# Patient Record
Sex: MALE | Race: WHITE | Employment: FULL TIME | URBAN - METROPOLITAN AREA
[De-identification: names, ages, dates, MRNs, and addresses within clinical notes are randomized per-mention and may not be internally consistent; named-entity substitution may affect disease eponyms.]

---

## 2017-05-03 ENCOUNTER — APPOINTMENT (OUTPATIENT)
Dept: OCCUPATIONAL MEDICINE | Facility: CLINIC | Age: 22
End: 2017-05-03

## 2017-05-03 PROCEDURE — 86580 TB INTRADERMAL TEST: CPT | Performed by: PHYSICIAN ASSISTANT

## 2018-05-01 ENCOUNTER — OFFICE VISIT (OUTPATIENT)
Dept: FAMILY MEDICINE | Facility: CLINIC | Age: 23
End: 2018-05-01

## 2018-05-01 VITALS
WEIGHT: 266 LBS | DIASTOLIC BLOOD PRESSURE: 81 MMHG | RESPIRATION RATE: 16 BRPM | TEMPERATURE: 99 F | BODY MASS INDEX: 34.14 KG/M2 | HEART RATE: 69 BPM | SYSTOLIC BLOOD PRESSURE: 139 MMHG | HEIGHT: 74 IN

## 2018-05-01 DIAGNOSIS — N50.89 TESTICULAR MASS: Primary | ICD-10-CM

## 2018-05-01 PROCEDURE — 99213 OFFICE O/P EST LOW 20 MIN: CPT | Performed by: FAMILY MEDICINE

## 2018-05-01 ASSESSMENT — PAIN SCALES - GENERAL: PAINLEVEL: NO PAIN (0)

## 2018-05-01 NOTE — NURSING NOTE
"Chief Complaint   Patient presents with     Groin Swelling     testicle pain 1 week       Initial /81  Pulse 69  Temp 99  F (37.2  C) (Tympanic)  Resp 16  Ht 6' 2\" (1.88 m)  Wt 266 lb (120.7 kg)  BMI 34.15 kg/m2 Estimated body mass index is 34.15 kg/(m^2) as calculated from the following:    Height as of this encounter: 6' 2\" (1.88 m).    Weight as of this encounter: 266 lb (120.7 kg).      Health Maintenance that is potentially due pending provider review:  ACT    Possibly completing today per provider review.    Is there anyone who you would like to be able to receive your results? No  If yes have patient fill out KENNY      "

## 2018-05-01 NOTE — MR AVS SNAPSHOT
"              After Visit Summary   5/1/2018    Iván Burton    MRN: [id number]           Patient Information     Date Of Birth          1995        Visit Information        Provider Department      5/1/2018 4:20 PM Al Pinzon MD [facility]        Today's Diagnoses     Testicular mass    -  1      Care Instructions    1. I am not to concerned about this but lets do US    2. I will call on result              Follow-ups after your visit        Future tests that were ordered for you today     Open Future Orders        Priority Expected Expires Ordered    US Testicular & Scrotum w Doppler Ltd Routine  5/1/2019 5/1/2018            Who to contact     If you have questions or need follow up information about today's clinic visit or your schedule please contact [facility] directly at [phone].  Normal or non-critical lab and imaging results will be communicated to you by MyChart, letter or phone within 4 business days after the clinic has received the results. If you do not hear from us within 7 days, please contact the clinic through MyChart or phone. If you have a critical or abnormal lab result, we will notify you by phone as soon as possible.  Submit refill requests through [employer] or call your pharmacy and they will forward the refill request to us. Please allow 3 business days for your refill to be completed.          Additional Information About Your Visit        MyChart Information     [employer] lets you send messages to your doctor, view your test results, renew your prescriptions, schedule appointments and more. To sign up, go to [email or web address] . Click on \"Log in\" on the left side of the screen, which will take you to the Welcome page. Then click on \"Sign up Now\" on the right side of the page.     You will be asked to enter the access code listed below, as well as some personal information. Please follow the directions to create your " "username and password.     Your access code is: [email or web address]  Expires: 2018  4:29 PM     Your access code will  in 90 days. If you need help or a new code, please call your [facility] or [phone].        Care EveryWhere ID     This is your Care EveryWhere ID. This could be used by other organizations to access your [facility] medical records  [id number]        Your Vitals Were     Pulse Temperature Respirations Height BMI (Body Mass Index)       69 99  F (37.2  C) (Tympanic) 16 6' 2\" (1.88 m) 34.15 kg/m2        Blood Pressure from Last 3 Encounters:   18 139/81   16 130/56   01/12/15 112/70    Weight from Last 3 Encounters:   18 266 lb (120.7 kg)   16 216 lb 12.8 oz (98.3 kg) (97 %)*   01/12/15 218 lb (98.9 kg) (97 %)*     * Growth percentiles are based on CDC 2-20 Years data.               Primary Care Provider Fax #    Physician No Ref-Primary [phone]       No address on file        Equal Access to Services     SALMA MOJICA AH: Hadmaryjane Toussaint, wavalentineda lukhrisadaha, qaybta kaalmada adeoliverda, mckenna reno. So [facility] [phone].    ATENCIÓN: Si habla español, tiene a jauregui disposición servicios gratuitos de asistencia lingüística. Llame al [phone].    We comply with applicable federal civil rights laws and [place] laws. We do not discriminate on the basis of race, color, national origin, age, disability, sex, sexual orientation, or gender identity.            Thank you!     Thank you for choosing [facility]  for your care. Our goal is always to provide you with excellent care. Hearing back from our patients is one way we can continue to improve our services. Please take a few minutes to complete the written survey that you may receive in the mail after your visit with us. Thank you!             Your Updated Medication List - Protect others around you: Learn how to safely use, store and throw away your " medicines at www.disposemymeds.org.          This list is accurate as of 5/1/18  4:29 PM.  Always use your most recent med list.                   Brand Name Dispense Instructions for use Diagnosis    TYLENOL PO      AS DIRECTED FOR PAIN

## 2018-05-01 NOTE — PROGRESS NOTES
SUBJECTIVE:   Iván Burton is a 22 year old male who presents to clinic today for the following health issues:      Testicle pain/lump  HE COMES WITH SOME PAIN AND A SWELLING ON HIS RIGHT TESTICLE.  THINKS HE MAY HAVE HAD SOME NODULE THERE BEFORE BUT NOW WITH PAIN       Duration: 1 week    Description (location/character/radiation): right testicle has lump    Intensity:  moderate    Accompanying signs and symptoms: sore    History (similar episodes/previous evaluation): sexually active, no concern for STD, no family hx of testicular cancers    Precipitating or alleviating factors: None    Therapies tried and outcome: None           Problem list and histories reviewed & adjusted, as indicated.  Additional history: as documented    Patient Active Problem List   Diagnosis     Attention deficit hyperactivity disorder (ADHD)     Generalized anxiety disorder     Chronic constipation     Mild major depression (H)     GERD (gastroesophageal reflux disease)     Moderate persistent asthma     Past Surgical History:   Procedure Laterality Date     SURGICAL HISTORY OF -   less than 2-1/2 years old    thumb injury        Social History   Substance Use Topics     Smoking status: Never Smoker     Smokeless tobacco: Never Used     Alcohol use No     Family History   Problem Relation Age of Onset     Unknown/Adopted Father      Unknown/Adopted Paternal Grandmother      Unknown/Adopted Paternal Grandfather          Current Outpatient Prescriptions   Medication Sig Dispense Refill     TYLENOL OR AS DIRECTED FOR PAIN       Allergies   Allergen Reactions     No Known Drug Allergies        Reviewed and updated as needed this visit by clinical staff  Tobacco  Allergies  Meds       Reviewed and updated as needed this visit by Provider         ROS:  CONSTITUTIONAL: NEGATIVE for fever, chills, change in weight  ENT/MOUTH: NEGATIVE for ear, mouth and throat problems  RESP: NEGATIVE for significant cough or SOB  CV: NEGATIVE for chest  "pain, palpitations or peripheral edema    OBJECTIVE:     /81  Pulse 69  Temp 99  F (37.2  C) (Tympanic)  Resp 16  Ht 6' 2\" (1.88 m)  Wt 266 lb (120.7 kg)  BMI 34.15 kg/m2  Body mass index is 34.15 kg/(m^2).  GENERAL: healthy, alert and no distress  THE RIGHT TESTICLE HAS A PROMINENT AREA AT THE LOWER POLE.  NO TENDER TO EXAM.          ASSESSMENT/PLAN:             1. Testicular mass  WILL GET:   - US Testicular & Scrotum w Doppler Ltd; Future      Al Pinzon MD  [facility]    "

## 2018-05-02 ENCOUNTER — HOSPITAL ENCOUNTER (EMERGENCY)
Facility: CLINIC | Age: 23
Discharge: LEFT BEFORE DISCHARGE | End: 2018-05-03
Attending: EMERGENCY MEDICINE | Admitting: EMERGENCY MEDICINE

## 2018-05-02 VITALS
SYSTOLIC BLOOD PRESSURE: 127 MMHG | TEMPERATURE: 98.1 F | DIASTOLIC BLOOD PRESSURE: 75 MMHG | WEIGHT: 265 LBS | HEIGHT: 74 IN | RESPIRATION RATE: 16 BRPM | BODY MASS INDEX: 34.01 KG/M2 | OXYGEN SATURATION: 99 %

## 2018-05-02 PROCEDURE — 40000268 ZZH STATISTIC NO CHARGES: Performed by: EMERGENCY MEDICINE

## 2018-05-03 NOTE — ED TRIAGE NOTES
R testicle pain and swelling for approx 1 week was seen in clinic yesterday has ultrasound scheduled on Friday   Today around 1630 jumped down from truck bed had increase in pain and swelling tried ice no relief  No difficulty urinating

## 2018-05-03 NOTE — ED NOTES
Checked lobby again pt has not been seen by security or registration  Assume left without being seen

## 2019-04-05 ENCOUNTER — TELEPHONE (OUTPATIENT)
Dept: FAMILY MEDICINE | Facility: CLINIC | Age: 24
End: 2019-04-05

## 2019-04-05 NOTE — TELEPHONE ENCOUNTER
Panel Management Review      Patient has the following on his problem list:     Asthma review     ACT Total Scores 3/14/2016   ACT TOTAL SCORE -   ASTHMA ER VISITS -   ASTHMA HOSPITALIZATIONS -   ACT TOTAL SCORE (Goal Greater than or Equal to 20) 25   In the past 12 months, how many times did you visit the emergency room for your asthma without being admitted to the hospital? 0   In the past 12 months, how many times were you hospitalized overnight because of your asthma? 0      1. Is Asthma diagnosis on the Problem List? Yes    2. Is Asthma listed on Health Maintenance? Yes    3. Patient is due for:  ACT and AAP      Composite cancer screening  Chart review shows that this patient is due/due soon for the following None  Summary:    Patient is due/failing the following:   AAP and ACT    Action needed:   Patient needs to do ACT.    Type of outreach:    Sent letter.    Questions for provider review:    None                                                                                                                                    Dayna Mancilla MA

## 2020-03-02 ENCOUNTER — OFFICE VISIT (OUTPATIENT)
Dept: FAMILY MEDICINE | Facility: CLINIC | Age: 25
End: 2020-03-02

## 2020-03-02 VITALS
HEART RATE: 76 BPM | WEIGHT: 285 LBS | TEMPERATURE: 97.8 F | RESPIRATION RATE: 16 BRPM | BODY MASS INDEX: 36.57 KG/M2 | DIASTOLIC BLOOD PRESSURE: 80 MMHG | OXYGEN SATURATION: 97 % | SYSTOLIC BLOOD PRESSURE: 128 MMHG | HEIGHT: 74 IN

## 2020-03-02 DIAGNOSIS — R10.2 PERINEAL PAIN IN MALE: ICD-10-CM

## 2020-03-02 DIAGNOSIS — E66.812 CLASS 2 OBESITY WITHOUT SERIOUS COMORBIDITY WITH BODY MASS INDEX (BMI) OF 36.0 TO 36.9 IN ADULT, UNSPECIFIED OBESITY TYPE: ICD-10-CM

## 2020-03-02 DIAGNOSIS — K21.9 GASTROESOPHAGEAL REFLUX DISEASE WITHOUT ESOPHAGITIS: Primary | ICD-10-CM

## 2020-03-02 PROCEDURE — 99213 OFFICE O/P EST LOW 20 MIN: CPT | Performed by: FAMILY MEDICINE

## 2020-03-02 ASSESSMENT — ASTHMA QUESTIONNAIRES
QUESTION_4 LAST FOUR WEEKS HOW OFTEN HAVE YOU USED YOUR RESCUE INHALER OR NEBULIZER MEDICATION (SUCH AS ALBUTEROL): NOT AT ALL
QUESTION_5 LAST FOUR WEEKS HOW WOULD YOU RATE YOUR ASTHMA CONTROL: COMPLETELY CONTROLLED
QUESTION_1 LAST FOUR WEEKS HOW MUCH OF THE TIME DID YOUR ASTHMA KEEP YOU FROM GETTING AS MUCH DONE AT WORK, SCHOOL OR AT HOME: NONE OF THE TIME
QUESTION_3 LAST FOUR WEEKS HOW OFTEN DID YOUR ASTHMA SYMPTOMS (WHEEZING, COUGHING, SHORTNESS OF BREATH, CHEST TIGHTNESS OR PAIN) WAKE YOU UP AT NIGHT OR EARLIER THAN USUAL IN THE MORNING: NOT AT ALL
QUESTION_2 LAST FOUR WEEKS HOW OFTEN HAVE YOU HAD SHORTNESS OF BREATH: NOT AT ALL
ACT_TOTALSCORE: 25

## 2020-03-02 ASSESSMENT — MIFFLIN-ST. JEOR: SCORE: 2357.5

## 2020-03-02 NOTE — PATIENT INSTRUCTIONS
ASSESSMENT:   (K21.9) Gastroesophageal reflux disease without esophagitis  (primary encounter diagnosis)  Comment: bloating and acid reflux.   Plan: omeprazole (PRILOSEC) 20 MG DR capsule          Gastroesophageal reflux (GERD) lifestyle changes that can help improve symptoms include:  Eating smaller meals and not lying down after meals  Elevate head of bed with 6-8 inch blocks or a wedge pillow.   Avoid lying flat on back after eating and at bedtime  Avoid tight fitting clothing  Avoid reflux inducing foods like fat, caffeine, chocolate, carbonated beverages  Maintain an ideal body weight  Avoid alcohol and tobacco  Avoid medications like ibuprofen, naproxen and aspirin    Take omeprazole for 4-6 weeks.  You should be better in 2 weeks.   If doing well, take for 6 weeks and stop.    If not better, let me know.     (R10.2) Perineal pain in male  Comment: No sign of infection.  There may be inflammation in your rectal area.   Plan:   Try Aleve.  Take two pills twice daily for 10 days.  Take with food.   If not improving in the next week or worse.

## 2020-03-02 NOTE — PROGRESS NOTES
SUBJECTIVE: Iván Burton is a 24 year old male  Chief Complaint   Patient presents with     Abdominal Pain      Gastrointestinal symptoms      Duration: 2 weeks for abdominal   Rectal  2 weeks    Description:           ABDOMINAL PAIN - epigastric area  .   Pain is described as aching and bloating     Intensity:  1/10    Accompanying signs and symptoms:  nausea, diarrhea at times and bloating  Has increase in numbers of stools he has    History  Previous {similar problem: Bloating in the past  Previous evaluation:  none    Aggravating factors: after eating will notice bloating    Alleviating factors:     Other Therapies tried: none      Two weeks ago rectum started hurting.  He had some diarrhea, a couple times.    Some shooting pain toward scrotum.    Felt pressure in perineum.    Course of symptoms over time: rectal pain a little better.     Continuing pain in rectum.  No blood.     Some bloating during the same time period.   Triggering factors: eating.  No specific foods.   occasional nausea.  No emesis.   He has had acid reflux.  Can wake up choking on it.  Onset of symptoms: past year a couple times a month.  Not taking medication.  Tums helped.    No tobacco  Alcohol:sometimes.   Caffeine: energy drink in AM.   Coffee daily.  30  Oz a day.  Little pop.     Bowels usually regular 1-2 stools a day.  In the past 3 weeks softer and three times daily.   Has not been having constipation.   He has been taking Benefiber-has had constipation in the past.    : No dysuria, bladder or kidney problems, POSITIVE for:, urinary frequency but drinks a lot of water.      Sexually active.  One partner for 5 years opposite sex.  No exposure for the past 2 years.  Had chlamydia 2 years ago-treated.     Patient Active Problem List   Diagnosis     Attention deficit hyperactivity disorder (ADHD)     Generalized anxiety disorder     Chronic constipation     Mild major depression (H)     GERD (gastroesophageal reflux disease)      "Moderate persistent asthma      OBJECTIVE:Blood pressure 128/80, pulse 76, temperature 97.8  F (36.6  C), temperature source Tympanic, resp. rate 16, height 1.88 m (6' 2\"), weight 129.3 kg (285 lb), SpO2 97 %. BMI=Body mass index is 36.59 kg/m .  GENERAL APPEARANCE ADULT: Alert, no acute distress, obese  NECK: No adenopathy,masses or thyromegaly  CV: normal rate, regular rhythm, no murmur or gallop  ABDOMEN: soft, no organomegaly, masses or tenderness, femoral pulses: normal.  NO inguinal adenopathy.    (male): penis, scrotum, testes normal, no hernias, non-tender testes and epididymus.  Anus normal, rectal exam normal     ASSESSMENT:   (K21.9) Gastroesophageal reflux disease without esophagitis  (primary encounter diagnosis)  Comment: bloating and acid reflux.   Plan: omeprazole (PRILOSEC) 20 MG DR capsule          Gastroesophageal reflux (GERD) lifestyle changes that can help improve symptoms include:  Eating smaller meals and not lying down after meals  Elevate head of bed with 6-8 inch blocks or a wedge pillow.   Avoid lying flat on back after eating and at bedtime  Avoid tight fitting clothing  Avoid reflux inducing foods like fat, caffeine, chocolate, carbonated beverages  Maintain an ideal body weight  Avoid alcohol and tobacco  Avoid medications like ibuprofen, naproxen and aspirin    Take omeprazole for 4-6 weeks.  You should be better in 2 weeks.   If doing well, take for 6 weeks and stop.    If not better, let me know.     (R10.2) Perineal pain in male  Comment: No sign of infection.  There may be inflammation in your rectal area.   Plan:   Try Aleve.  Take two pills twice daily for 10 days.  Take with food.   If not improving in the next week or worse.           "

## 2020-03-03 ASSESSMENT — ASTHMA QUESTIONNAIRES: ACT_TOTALSCORE: 25

## 2020-03-09 PROBLEM — E66.9 OBESITY: Status: ACTIVE | Noted: 2020-03-09

## 2021-01-28 ENCOUNTER — OFFICE VISIT (OUTPATIENT)
Dept: FAMILY MEDICINE | Facility: CLINIC | Age: 26
End: 2021-01-28
Payer: COMMERCIAL

## 2021-01-28 VITALS
OXYGEN SATURATION: 97 % | RESPIRATION RATE: 14 BRPM | SYSTOLIC BLOOD PRESSURE: 136 MMHG | HEART RATE: 72 BPM | TEMPERATURE: 98.2 F | DIASTOLIC BLOOD PRESSURE: 76 MMHG

## 2021-01-28 DIAGNOSIS — H69.91 DYSFUNCTION OF RIGHT EUSTACHIAN TUBE: Primary | ICD-10-CM

## 2021-01-28 DIAGNOSIS — F32.0 MILD MAJOR DEPRESSION (H): ICD-10-CM

## 2021-01-28 PROCEDURE — 99214 OFFICE O/P EST MOD 30 MIN: CPT | Performed by: PHYSICIAN ASSISTANT

## 2021-01-28 RX ORDER — ESCITALOPRAM OXALATE 10 MG/1
10 TABLET ORAL DAILY
Qty: 60 TABLET | Refills: 1 | Status: SHIPPED | OUTPATIENT
Start: 2021-01-28 | End: 2021-11-02

## 2021-01-28 ASSESSMENT — ANXIETY QUESTIONNAIRES
6. BECOMING EASILY ANNOYED OR IRRITABLE: MORE THAN HALF THE DAYS
GAD7 TOTAL SCORE: 11
5. BEING SO RESTLESS THAT IT IS HARD TO SIT STILL: SEVERAL DAYS
7. FEELING AFRAID AS IF SOMETHING AWFUL MIGHT HAPPEN: MORE THAN HALF THE DAYS
1. FEELING NERVOUS, ANXIOUS, OR ON EDGE: MORE THAN HALF THE DAYS
2. NOT BEING ABLE TO STOP OR CONTROL WORRYING: MORE THAN HALF THE DAYS
3. WORRYING TOO MUCH ABOUT DIFFERENT THINGS: SEVERAL DAYS

## 2021-01-28 ASSESSMENT — PATIENT HEALTH QUESTIONNAIRE - PHQ9
SUM OF ALL RESPONSES TO PHQ QUESTIONS 1-9: 8
5. POOR APPETITE OR OVEREATING: SEVERAL DAYS

## 2021-01-28 NOTE — PROGRESS NOTES
"  Assessment & Plan   Dysfunction of right eustachian tube  R sided throat pain and ear pain x 3 weeks. No progression. Had Covid 2 months ago that resolved. Vitals and exam are wnl. Possibly some bulging of the Tms bilaterally. Suspect allergies vs eustachian tube dysfunction. Recommend daily allergy pill and Ibuprofen. RTC prn for any new, changing or worsening symptoms.      Mild major depression (H)  Worsening depression. Grandfather recently passed away whom he was close to. PHQ and BLAKE 7 scores are elevated. Used to take Prozac years ago, not sure if it was effective or not. Discussed medication side effects. Patient made the informed decision to start Lexapro today. Follow-up in 4-6 weeks for reevaluation.     - escitalopram (LEXAPRO) 10 MG tablet; Take 1 tablet (10 mg) by mouth daily     BMI:   Estimated body mass index is 36.59 kg/m  as calculated from the following:    Height as of 3/2/20: 1.88 m (6' 2\").    Weight as of 3/2/20: 129.3 kg (285 lb).     Return in about 4 weeks (around 2021) for Video Visit, In-Clinic Visit.    STEPHANIE Godinez [facility] [place]    Subjective     Iván is a 25 year old who presents to clinic today for the following health issues     HPI   Acute Illness  Acute illness concerns: Throat pain, ear pain. Was positive for COVID on 2020  Onset/Duration: 3 weeks   Symptoms:  Fever: no  Chills/Sweats: no  Headache (location?): no  Sinus Pressure: YES  Conjunctivitis:  no  Ear Pain: YES: right  Rhinorrhea: no  Congestion: YES  Sore Throat: YES  Cough: YES-productive of yellow sputum  Wheeze: no  Decreased Appetite: no  Nausea: no  Vomiting: no  Diarrhea: no  Dysuria/Freq.: no  Dysuria or Hematuria: no  Fatigue/Achiness: no  Sick/Strep Exposure: no  Therapies tried and outcome: Tylenol, Ibuprofen and mucinex     Also with depression. Grandfather just . Anxiety has been worse as well.   PHQ 3/14/2016 2021   PHQ-9 Total Score 2 8   Q9: " Thoughts of better off dead/self-harm past 2 weeks Not at all Not at all     BLAKE-7 SCORE 1/12/2015 3/14/2016 1/28/2021   Total Score 0 - -   Total Score - 0 11       Review of Systems   Constitutional, HEENT, cardiovascular, pulmonary, gi and gu systems are negative, except as otherwise noted.      Objective    /76 (BP Location: Right arm, Patient Position: Sitting, Cuff Size: Adult Large)   Pulse 72   Temp 98.2  F (36.8  C) (Tympanic)   Resp 14   SpO2 97%   There is no height or weight on file to calculate BMI.  Physical Exam   Constitutional: healthy, alert, and no distress  Head: Normocephalic. Atraumatic  Eyes: No conjunctival injection, sclera anicteric  ENT: MMM, PO clear without tonsillar enlargement. No exudates. TMs and canals wnl, possibly some bulging bilaterally.   Neck: supple, no thyromegaly, nodules or asymmetry of the thyroid. No cervical LAD.  Cardiovascular: RRR. No murmurs, clicks, gallops, or rubs. No peripheral edema.   Respiratory: No resp distress. Lungs CTAB bilaterally.   Musculoskeletal: extremities normal- no gross deformities noted, and normal muscle tone  Skin: no suspicious lesions or rashes  Neurologic: Gait normal. CN 2-12 grossly intact  Psychiatric: mentation appears normal and affect normal/bright

## 2021-01-29 ASSESSMENT — ANXIETY QUESTIONNAIRES: GAD7 TOTAL SCORE: 11

## 2021-02-25 ENCOUNTER — VIRTUAL VISIT (OUTPATIENT)
Dept: FAMILY MEDICINE | Facility: CLINIC | Age: 26
End: 2021-02-25
Payer: COMMERCIAL

## 2021-02-25 DIAGNOSIS — Z53.9 ERRONEOUS ENCOUNTER--DISREGARD: Primary | ICD-10-CM

## 2021-02-25 ASSESSMENT — ANXIETY QUESTIONNAIRES
7. FEELING AFRAID AS IF SOMETHING AWFUL MIGHT HAPPEN: SEVERAL DAYS
1. FEELING NERVOUS, ANXIOUS, OR ON EDGE: SEVERAL DAYS
2. NOT BEING ABLE TO STOP OR CONTROL WORRYING: SEVERAL DAYS
GAD7 TOTAL SCORE: 8
3. WORRYING TOO MUCH ABOUT DIFFERENT THINGS: SEVERAL DAYS
5. BEING SO RESTLESS THAT IT IS HARD TO SIT STILL: SEVERAL DAYS
6. BECOMING EASILY ANNOYED OR IRRITABLE: SEVERAL DAYS

## 2021-02-25 ASSESSMENT — PATIENT HEALTH QUESTIONNAIRE - PHQ9
SUM OF ALL RESPONSES TO PHQ QUESTIONS 1-9: 3
5. POOR APPETITE OR OVEREATING: MORE THAN HALF THE DAYS

## 2021-02-25 NOTE — PROGRESS NOTES
Pt never answered video visit. Reached out three times throughout the day. Left two voicemails for patient to return call. Pt will need to reschedule appt.     Iván Delgado PA-C

## 2021-02-26 ASSESSMENT — ANXIETY QUESTIONNAIRES: GAD7 TOTAL SCORE: 8

## 2021-05-03 ENCOUNTER — ALLIED HEALTH/NURSE VISIT (OUTPATIENT)
Dept: FAMILY MEDICINE | Facility: CLINIC | Age: 26
End: 2021-05-03
Payer: COMMERCIAL

## 2021-05-03 ENCOUNTER — TELEPHONE (OUTPATIENT)
Dept: FAMILY MEDICINE | Facility: CLINIC | Age: 26
End: 2021-05-03

## 2021-05-03 DIAGNOSIS — J45.40 MODERATE PERSISTENT ASTHMA WITHOUT COMPLICATION: Primary | ICD-10-CM

## 2021-05-03 PROCEDURE — 99207 PR NO CHARGE NURSE ONLY: CPT | Performed by: PHYSICIAN ASSISTANT

## 2021-05-03 ASSESSMENT — ASTHMA QUESTIONNAIRES
QUESTION_3 LAST FOUR WEEKS HOW OFTEN DID YOUR ASTHMA SYMPTOMS (WHEEZING, COUGHING, SHORTNESS OF BREATH, CHEST TIGHTNESS OR PAIN) WAKE YOU UP AT NIGHT OR EARLIER THAN USUAL IN THE MORNING: NOT AT ALL
QUESTION_5 LAST FOUR WEEKS HOW WOULD YOU RATE YOUR ASTHMA CONTROL: COMPLETELY CONTROLLED
QUESTION_2 LAST FOUR WEEKS HOW OFTEN HAVE YOU HAD SHORTNESS OF BREATH: NOT AT ALL
QUESTION_4 LAST FOUR WEEKS HOW OFTEN HAVE YOU USED YOUR RESCUE INHALER OR NEBULIZER MEDICATION (SUCH AS ALBUTEROL): NOT AT ALL
ACT_TOTALSCORE: 25
QUESTION_1 LAST FOUR WEEKS HOW MUCH OF THE TIME DID YOUR ASTHMA KEEP YOU FROM GETTING AS MUCH DONE AT WORK, SCHOOL OR AT HOME: NONE OF THE TIME

## 2021-05-03 NOTE — TELEPHONE ENCOUNTER
Left message for patient to call the clinic.   Please complete asthma control test. Questions have previously been mailed to patient.   Janet Busby CMA

## 2021-05-04 ASSESSMENT — ASTHMA QUESTIONNAIRES: ACT_TOTALSCORE: 25

## 2021-11-02 ENCOUNTER — OFFICE VISIT (OUTPATIENT)
Dept: FAMILY MEDICINE | Facility: CLINIC | Age: 26
End: 2021-11-02
Payer: COMMERCIAL

## 2021-11-02 VITALS
TEMPERATURE: 97 F | RESPIRATION RATE: 18 BRPM | HEART RATE: 80 BPM | SYSTOLIC BLOOD PRESSURE: 142 MMHG | BODY MASS INDEX: 38.76 KG/M2 | WEIGHT: 302 LBS | DIASTOLIC BLOOD PRESSURE: 82 MMHG | HEIGHT: 74 IN

## 2021-11-02 DIAGNOSIS — E66.01 MORBID OBESITY (H): ICD-10-CM

## 2021-11-02 DIAGNOSIS — R10.9 ABDOMINAL PAIN, UNSPECIFIED ABDOMINAL LOCATION: Primary | ICD-10-CM

## 2021-11-02 DIAGNOSIS — F32.0 MILD MAJOR DEPRESSION (H): ICD-10-CM

## 2021-11-02 LAB
ALBUMIN SERPL-MCNC: 4 G/DL (ref 3.4–5)
ALBUMIN UR-MCNC: NEGATIVE MG/DL
ALP SERPL-CCNC: 92 U/L (ref 40–150)
ALT SERPL W P-5'-P-CCNC: 50 U/L (ref 0–70)
ANION GAP SERPL CALCULATED.3IONS-SCNC: 6 MMOL/L (ref 3–14)
APPEARANCE UR: CLEAR
AST SERPL W P-5'-P-CCNC: 20 U/L (ref 0–45)
BILIRUB SERPL-MCNC: 0.5 MG/DL (ref 0.2–1.3)
BILIRUB UR QL STRIP: NEGATIVE
BUN SERPL-MCNC: 18 MG/DL (ref 7–30)
CALCIUM SERPL-MCNC: 9.2 MG/DL (ref 8.5–10.1)
CHLORIDE BLD-SCNC: 108 MMOL/L (ref 94–109)
CO2 SERPL-SCNC: 25 MMOL/L (ref 20–32)
COLOR UR AUTO: YELLOW
CREAT SERPL-MCNC: 0.9 MG/DL (ref 0.66–1.25)
ERYTHROCYTE [DISTWIDTH] IN BLOOD BY AUTOMATED COUNT: 13.3 % (ref 10–15)
GFR SERPL CREATININE-BSD FRML MDRD: >90 ML/MIN/1.73M2
GLUCOSE BLD-MCNC: 126 MG/DL (ref 70–99)
GLUCOSE UR STRIP-MCNC: NEGATIVE MG/DL
HCT VFR BLD AUTO: 44.6 % (ref 40–53)
HGB BLD-MCNC: 15.9 G/DL (ref 13.3–17.7)
HGB UR QL STRIP: NEGATIVE
KETONES UR STRIP-MCNC: NEGATIVE MG/DL
LEUKOCYTE ESTERASE UR QL STRIP: NEGATIVE
MCH RBC QN AUTO: 31.1 PG (ref 26.5–33)
MCHC RBC AUTO-ENTMCNC: 35.7 G/DL (ref 31.5–36.5)
MCV RBC AUTO: 87 FL (ref 78–100)
NITRATE UR QL: NEGATIVE
PH UR STRIP: 5.5 [PH] (ref 5–7)
PLATELET # BLD AUTO: 269 10E3/UL (ref 150–450)
POTASSIUM BLD-SCNC: 3.9 MMOL/L (ref 3.4–5.3)
PROT SERPL-MCNC: 7.7 G/DL (ref 6.8–8.8)
RBC # BLD AUTO: 5.12 10E6/UL (ref 4.4–5.9)
SODIUM SERPL-SCNC: 139 MMOL/L (ref 133–144)
SP GR UR STRIP: >=1.03 (ref 1–1.03)
UROBILINOGEN UR STRIP-ACNC: 0.2 E.U./DL
WBC # BLD AUTO: 7.8 10E3/UL (ref 4–11)

## 2021-11-02 PROCEDURE — 36415 COLL VENOUS BLD VENIPUNCTURE: CPT | Performed by: FAMILY MEDICINE

## 2021-11-02 PROCEDURE — 80053 COMPREHEN METABOLIC PANEL: CPT | Performed by: FAMILY MEDICINE

## 2021-11-02 PROCEDURE — 99214 OFFICE O/P EST MOD 30 MIN: CPT | Performed by: FAMILY MEDICINE

## 2021-11-02 PROCEDURE — 81003 URINALYSIS AUTO W/O SCOPE: CPT | Performed by: FAMILY MEDICINE

## 2021-11-02 PROCEDURE — 85027 COMPLETE CBC AUTOMATED: CPT | Performed by: FAMILY MEDICINE

## 2021-11-02 RX ORDER — ESCITALOPRAM OXALATE 10 MG/1
10 TABLET ORAL DAILY
Qty: 60 TABLET | Refills: 1 | Status: SHIPPED | OUTPATIENT
Start: 2021-11-02 | End: 2023-10-04

## 2021-11-02 ASSESSMENT — PAIN SCALES - GENERAL: PAINLEVEL: SEVERE PAIN (7)

## 2021-11-02 ASSESSMENT — MIFFLIN-ST. JEOR: SCORE: 2424.61

## 2021-11-02 NOTE — NURSING NOTE
"Chief Complaint   Patient presents with     Abdominal Pain     BP (!) 142/82 (Cuff Size: Adult Large)   Pulse 80   Temp 97  F (36.1  C) (Tympanic)   Resp 18   Ht 1.88 m (6' 2\")   Wt 137 kg (302 lb)   BMI 38.77 kg/m   Estimated body mass index is 38.77 kg/m  as calculated from the following:    Height as of this encounter: 1.88 m (6' 2\").    Weight as of this encounter: 137 kg (302 lb).  Patient presents to the clinic using No DME      Health Maintenance that is potentially due pending provider review:    Health Maintenance Due   Topic Date Due     ANNUAL REVIEW OF HM ORDERS  Never done     ADVANCE CARE PLANNING  Never done     Pneumococcal Vaccine: Pediatrics (0 to 5 Years) and At-Risk Patients (6 to 64 Years) (1 of 2 - PPSV23) Never done     HPV IMMUNIZATION (1 - Male 2-dose series) Never done     COVID-19 Vaccine (1) Never done     PREVENTIVE CARE VISIT  09/04/2010     HIV SCREENING  Never done     HEPATITIS C SCREENING  Never done     ASTHMA ACTION PLAN  03/30/2017     DTAP/TDAP/TD IMMUNIZATION (7 - Td or Tdap) 01/31/2018     PHQ-9  08/25/2021     INFLUENZA VACCINE (1) 09/01/2021     ASTHMA CONTROL TEST  11/03/2021                "

## 2021-11-02 NOTE — PROGRESS NOTES
"  Assessment & Plan     Abdominal pain, unspecified abdominal location  Differentials discussed in detail including but not limited to cholelithiasis, nephrolithiasis, musculoskeletal etiology, gastritis and abdominal hernia.  Physical examination unremarkable.  CBC, CMP, UA and CT abdomen/pelvis ordered for further evaluation considering chronicity of symptoms.  Recommended to try omeprazole and continue healthy diet, well hydration.  Follow-up in 2 weeks or earlier if needed  - CBC with platelets; Future  - Comprehensive metabolic panel (BMP + Alb, Alk Phos, ALT, AST, Total. Bili, TP); Future  - UA reflex to Microscopic - lab collect; Future  - CT Abdomen Pelvis w Contrast; Future    Morbid obesity (H)  Stressed on healthy diet and regular exercise    Mild major depression (H)  Mood symptoms stable.  Lexapro refilled  - escitalopram (LEXAPRO) 10 MG tablet; Take 1 tablet (10 mg) by mouth daily      BMI:   Estimated body mass index is 38.77 kg/m  as calculated from the following:    Height as of this encounter: 1.88 m (6' 2\").    Weight as of this encounter: 137 kg (302 lb).   Weight management plan: Discussed healthy diet and exercise guidelines        Jett Mobley MD  [facility]    Susanna Minor is a 26 year old who presents for the following health issues     HPI     Abdominal/back pain   Onset/Duration: 3 weeks   Description:   Character: Sharp and Dull ache  Location: right upper quadrant  Radiation: Back and Scrotum  Intensity: moderate  Progression of Symptoms:  same  Accompanying Signs & Symptoms:  Fever/Chills: no  Gas/Bloating: YES  Nausea: no  Vomitting: no  Diarrhea: no  Constipation: no  Dysuria or Hematuria: no  History:   Trauma: no  Previous similar pain: no  Previous tests done: none  Precipitating factors:   Does the pain change with:     Food: YES- worse after eating. Gets bloated and gassy     Bowel Movement: YES- better after. Releases pressure but pain is " "still present     Urination: no   Other factors:  no  Therapies tried and outcome: tylenol         Review of Systems   Constitutional, HEENT, cardiovascular, pulmonary, GI, , musculoskeletal, neuro, skin, endocrine and psych systems are negative, except as otherwise noted.      Objective    BP (!) 142/82 (Cuff Size: Adult Large)   Pulse 80   Temp 97  F (36.1  C) (Tympanic)   Resp 18   Ht 1.88 m (6' 2\")   Wt 137 kg (302 lb)   BMI 38.77 kg/m    Body mass index is 38.77 kg/m .  Physical Exam   GENERAL: alert, no distress and obese  EYES: Eyes grossly normal to inspection, PERRL and conjunctivae and sclerae normal  HENT: normal cephalic/atraumatic, nose and mouth without ulcers or lesions, oropharynx clear and oral mucous membranes moist  NECK: no adenopathy, no asymmetry, masses, or scars and thyroid normal to palpation  RESP: lungs clear to auscultation - no rales, rhonchi or wheezes  CV: regular rate and rhythm, normal S1 S2, no S3 or S4, no murmur, click or rub  ABDOMEN: soft, nontender, no hepatosplenomegaly, no masses  MS: no gross musculoskeletal defects noted, no edema  NEURO: Normal strength and tone, mentation intact and speech normal  PSYCH: mentation appears normal, affect normal/bright            "

## 2021-11-04 ENCOUNTER — HOSPITAL ENCOUNTER (OUTPATIENT)
Dept: CT IMAGING | Facility: CLINIC | Age: 26
Discharge: HOME | End: 2021-11-04
Attending: FAMILY MEDICINE | Admitting: FAMILY MEDICINE
Payer: COMMERCIAL

## 2021-11-04 DIAGNOSIS — R10.9 ABDOMINAL PAIN, UNSPECIFIED ABDOMINAL LOCATION: ICD-10-CM

## 2021-11-04 PROCEDURE — 250N000011 HC RX IP 250 OP 636: Performed by: FAMILY MEDICINE

## 2021-11-04 PROCEDURE — 74177 CT ABD & PELVIS W/CONTRAST: CPT

## 2021-11-04 PROCEDURE — 250N000009 HC RX 250: Performed by: FAMILY MEDICINE

## 2021-11-04 RX ORDER — IOPAMIDOL 755 MG/ML
100 INJECTION, SOLUTION INTRAVASCULAR ONCE
Status: COMPLETED | OUTPATIENT
Start: 2021-11-04 | End: 2021-11-04

## 2021-11-04 RX ADMIN — SODIUM CHLORIDE 74 ML: 9 INJECTION, SOLUTION INTRAVENOUS at 15:01

## 2021-11-04 RX ADMIN — IOPAMIDOL 100 ML: 755 INJECTION, SOLUTION INTRAVENOUS at 15:01

## 2021-11-04 NOTE — RESULT ENCOUNTER NOTE
Mitchell Minro,  I am reviewing tests for Dr. Mobley.  Your CT scan looks good without any specific cause for pain identified.   PLAN: Try the omeprazole as recommended and follow-up .w worsening pain, new problems or continuing pain.   GARCÍA GARNER MD 
none

## 2021-11-14 ENCOUNTER — HEALTH MAINTENANCE LETTER (OUTPATIENT)
Age: 26
End: 2021-11-14

## 2022-03-10 ENCOUNTER — HOSPITAL ENCOUNTER (EMERGENCY)
Facility: CLINIC | Age: 27
Discharge: HOME | End: 2022-03-11
Attending: EMERGENCY MEDICINE | Admitting: EMERGENCY MEDICINE
Payer: OTHER MISCELLANEOUS

## 2022-03-10 VITALS
OXYGEN SATURATION: 98 % | SYSTOLIC BLOOD PRESSURE: 137 MMHG | RESPIRATION RATE: 14 BRPM | HEIGHT: 74 IN | TEMPERATURE: 98 F | DIASTOLIC BLOOD PRESSURE: 90 MMHG | WEIGHT: 290 LBS | BODY MASS INDEX: 37.22 KG/M2 | HEART RATE: 88 BPM

## 2022-03-10 DIAGNOSIS — S61.411A LACERATION OF RIGHT HAND WITHOUT FOREIGN BODY, INITIAL ENCOUNTER: ICD-10-CM

## 2022-03-10 PROCEDURE — 90715 TDAP VACCINE 7 YRS/> IM: CPT | Performed by: EMERGENCY MEDICINE

## 2022-03-10 PROCEDURE — 250N000011 HC RX IP 250 OP 636: Performed by: EMERGENCY MEDICINE

## 2022-03-10 PROCEDURE — 99283 EMERGENCY DEPT VISIT LOW MDM: CPT | Mod: 25 | Performed by: EMERGENCY MEDICINE

## 2022-03-10 PROCEDURE — 90471 IMMUNIZATION ADMIN: CPT | Performed by: EMERGENCY MEDICINE

## 2022-03-10 PROCEDURE — 99283 EMERGENCY DEPT VISIT LOW MDM: CPT | Performed by: EMERGENCY MEDICINE

## 2022-03-10 RX ADMIN — CLOSTRIDIUM TETANI TOXOID ANTIGEN (FORMALDEHYDE INACTIVATED), CORYNEBACTERIUM DIPHTHERIAE TOXOID ANTIGEN (FORMALDEHYDE INACTIVATED), BORDETELLA PERTUSSIS TOXOID ANTIGEN (GLUTARALDEHYDE INACTIVATED), BORDETELLA PERTUSSIS FILAMENTOUS HEMAGGLUTININ ANTIGEN (FORMALDEHYDE INACTIVATED), BORDETELLA PERTUSSIS PERTACTIN ANTIGEN, AND BORDETELLA PERTUSSIS FIMBRIAE 2/3 ANTIGEN 0.5 ML: 5; 2; 2.5; 5; 3; 5 INJECTION, SUSPENSION INTRAMUSCULAR at 23:58

## 2022-03-11 NOTE — ED PROVIDER NOTES
"Chief Complaint:   Chief Complaint   Patient presents with     Laceration       HPI:   Iván Burton is a 26 year old male who presents to the ED after cutting self on the right hand while at work.  Patient works as a  and had tate metal piece from the engine cut him on the right hand.   Injury occurred 3 hours ago.  He denies numbness of the right hand.  He denies dysfunction of the right hand or digits.   Tetanus status reviewed:  Td vaccination indicated and given today.     Medications:  Current Outpatient Medications   Medication Sig Dispense Refill     escitalopram (LEXAPRO) 10 MG tablet Take 1 tablet (10 mg) by mouth daily 60 tablet 1     omeprazole (PRILOSEC) 20 MG DR capsule Take 1 capsule (20 mg) by mouth daily Take in AM 30-60 minutes before eating. 30 capsule 1     TYLENOL OR AS DIRECTED FOR PAIN         Allergies:  Allergies   Allergen Reactions     No Known Drug Allergies        Medications updated and reviewed.  Past, family and surgical history is updated and reviewed in the record.     Review of Systems:  Skin: see HPI  Neuro: see HPI  Musculoskeletal: see HPI    Physical Exam:   BP (!) 137/90   Pulse 88   Temp 98  F (36.7  C) (Oral)   Resp 14   Ht 1.88 m (6' 2\")   Wt 131.5 kg (290 lb)   SpO2 98%   BMI 37.23 kg/m     General: healthy, alert and no distress  Head: Normocephalic.    CV: normal distal perfusion  Musculoskel/Extremities: NORMAL  Hand:   Neuro: no evidence for sensory deficits distal to wound  Skin:  3 cm laceration over the right dorsum over the first metacarpal, bleeding- mild and straight    Medical Decision Making:  Laceration with no evidence of neurovascular injury.  The wound will be closed using 4-0 absorbable suture.    Assessment:  1. Laceration of right hand without foreign body, initial encounter          Plan:   Imaging of the injured area area for foreign body or fracture was not indicated  Wound closed per procedure note below.      Keep wound clean and " dry for the next 24-48 hours  Signs of infection discussed today  Return to the ER with increased swelling, pain, redness, pus or fevers.     Condition on disposition: Stable        [facility] Procedure Note        Laceration Repair:    Performed by: Javier Lozano MD  Authorized by: Javier Lozano MD  Consent given by: Patient who states understanding of the procedure being performed after discussing the risks, benefits and alternatives.    Preparation: Patient was prepped and draped in usual sterile fashion.  Irrigation solution: saline  Prepped and draped in the usual sterile fashion  Wound soaked  Wound irrigated    Body area:right hand  Laceration length: 4cm  Contamination: The wound is not contaminated.  Foreign bodies:none  Tendon involvement: none  Anesthesia: Local  Local anesthetic: Lidocaine     1%, with epinephrine  Anesthetic total: 4ml    Debridement: none  Skin closure: Closed with 4 x 4.0 absorbable  Technique: interrupted  Approximation: close  Approximation difficulty: simple    Patient tolerance: Patient tolerated the procedure well with no immediate complications.         Javier Lozano MD  03/11/22 0042

## 2022-11-21 ENCOUNTER — HEALTH MAINTENANCE LETTER (OUTPATIENT)
Age: 27
End: 2022-11-21

## 2023-02-13 ENCOUNTER — NURSE TRIAGE (OUTPATIENT)
Dept: NURSING | Facility: CLINIC | Age: 28
End: 2023-02-13
Payer: COMMERCIAL

## 2023-02-13 NOTE — TELEPHONE ENCOUNTER
Marycruz Castellanos calling. Pt on his way home driving. Concerns about palpitations.  Caller with no c2c on file.  Advised to call back once pt is with her or available to call.    Caller verbalized understanding.    Ana Madison RN/[place] Nurse Advisor      Reason for Disposition    Information only question and nurse able to answer    Protocols used: INFORMATION ONLY CALL - NO TRIAGE-A-OH

## 2023-10-02 ASSESSMENT — ASTHMA QUESTIONNAIRES: ACT_TOTALSCORE: 24

## 2023-10-04 ENCOUNTER — OFFICE VISIT (OUTPATIENT)
Dept: FAMILY MEDICINE | Facility: CLINIC | Age: 28
End: 2023-10-04
Payer: COMMERCIAL

## 2023-10-04 VITALS
OXYGEN SATURATION: 98 % | DIASTOLIC BLOOD PRESSURE: 70 MMHG | HEIGHT: 74 IN | RESPIRATION RATE: 18 BRPM | TEMPERATURE: 98.3 F | SYSTOLIC BLOOD PRESSURE: 132 MMHG | HEART RATE: 67 BPM | BODY MASS INDEX: 36.57 KG/M2 | WEIGHT: 285 LBS

## 2023-10-04 DIAGNOSIS — F32.0 MILD MAJOR DEPRESSION (H): ICD-10-CM

## 2023-10-04 DIAGNOSIS — J01.00 ACUTE MAXILLARY SINUSITIS, RECURRENCE NOT SPECIFIED: Primary | ICD-10-CM

## 2023-10-04 DIAGNOSIS — F41.1 GENERALIZED ANXIETY DISORDER: ICD-10-CM

## 2023-10-04 DIAGNOSIS — R09.82 POST-NASAL DRAINAGE: ICD-10-CM

## 2023-10-04 PROCEDURE — 99214 OFFICE O/P EST MOD 30 MIN: CPT | Performed by: NURSE PRACTITIONER

## 2023-10-04 RX ORDER — FLUTICASONE PROPIONATE 50 MCG
1 SPRAY, SUSPENSION (ML) NASAL DAILY
Qty: 16 G | Refills: 1 | Status: SHIPPED | OUTPATIENT
Start: 2023-10-04

## 2023-10-04 RX ORDER — AZELASTINE 1 MG/ML
1 SPRAY, METERED NASAL 2 TIMES DAILY
Qty: 30 ML | Refills: 1 | Status: SHIPPED | OUTPATIENT
Start: 2023-10-04

## 2023-10-04 RX ORDER — ESCITALOPRAM OXALATE 10 MG/1
10 TABLET ORAL DAILY
Qty: 30 TABLET | Refills: 3 | Status: SHIPPED | OUTPATIENT
Start: 2023-10-04 | End: 2024-02-20

## 2023-10-04 ASSESSMENT — PATIENT HEALTH QUESTIONNAIRE - PHQ9
5. POOR APPETITE OR OVEREATING: MORE THAN HALF THE DAYS
SUM OF ALL RESPONSES TO PHQ QUESTIONS 1-9: 5
SUM OF ALL RESPONSES TO PHQ QUESTIONS 1-9: 5
10. IF YOU CHECKED OFF ANY PROBLEMS, HOW DIFFICULT HAVE THESE PROBLEMS MADE IT FOR YOU TO DO YOUR WORK, TAKE CARE OF THINGS AT HOME, OR GET ALONG WITH OTHER PEOPLE: SOMEWHAT DIFFICULT

## 2023-10-04 ASSESSMENT — ANXIETY QUESTIONNAIRES
GAD7 TOTAL SCORE: 15
1. FEELING NERVOUS, ANXIOUS, OR ON EDGE: MORE THAN HALF THE DAYS
5. BEING SO RESTLESS THAT IT IS HARD TO SIT STILL: SEVERAL DAYS
GAD7 TOTAL SCORE: 15
7. FEELING AFRAID AS IF SOMETHING AWFUL MIGHT HAPPEN: NEARLY EVERY DAY
6. BECOMING EASILY ANNOYED OR IRRITABLE: NEARLY EVERY DAY
2. NOT BEING ABLE TO STOP OR CONTROL WORRYING: MORE THAN HALF THE DAYS
3. WORRYING TOO MUCH ABOUT DIFFERENT THINGS: MORE THAN HALF THE DAYS

## 2023-10-04 ASSESSMENT — PAIN SCALES - GENERAL: PAINLEVEL: NO PAIN (0)

## 2023-10-04 NOTE — PROGRESS NOTES
Assessment & Plan     Acute maxillary sinusitis, recurrence not specified  No acute distress. With ongoing symptoms plan to start Augmentin as well as nasal sprays for chronic congestion and post-nasal drainage. Symptomatic care and follow up discussed  - amoxicillin-clavulanate (AUGMENTIN) 875-125 MG tablet; Take 1 tablet by mouth 2 times daily for 10 days  - fluticasone (FLONASE) 50 MCG/ACT nasal spray; Spray 1 spray into both nostrils daily  - azelastine (ASTELIN) 0.1 % nasal spray; Spray 1 spray into both nostrils 2 times daily    Post-nasal drainage  Per above  - fluticasone (FLONASE) 50 MCG/ACT nasal spray; Spray 1 spray into both nostrils daily  - azelastine (ASTELIN) 0.1 % nasal spray; Spray 1 spray into both nostrils 2 times daily    Mild major depression (H24)  Worsened recently, plan to restart the Lexapro at this time. Risk and benefits of medication discussed. Recheck in 1-2 months sooner if needed  - escitalopram (LEXAPRO) 10 MG tablet; Take 1 tablet (10 mg) by mouth daily    Generalized anxiety disorder  Not controlled per above.   - escitalopram (LEXAPRO) 10 MG tablet; Take 1 tablet (10 mg) by mouth daily      JAIME [facility]    Susanna Minor is a 27 year old, presenting for the following health issues:  Nasal Congestion        10/4/2023     9:11 AM   Additional Questions   Roomed by Janet CARDOZA CMA       History of Present Illness       Reason for visit:  Nasal congestion ongoing for moths  Symptom onset:  More than a month  Symptoms include:  Congestion  Symptom intensity:  Moderate  Symptom progression:  Staying the same  Had these symptoms before:  No    He eats 0-1 servings of fruits and vegetables daily.He consumes 1 sweetened beverage(s) daily.He exercises with enough effort to increase his heart rate 20 to 29 minutes per day.  He exercises with enough effort to increase his heart rate 3 or less days per week.   He is taking medications  "regularly.     Claritin and Zyrtec- intermittently when symptoms were bad  Intermittent nasal sprays  Benadryl made it worse  Right sinus pressure  Post-nasal drainage    Depression and Anxiety Follow-Up  How are you doing with your depression since your last visit? Worsened   How are you doing with your anxiety since your last visit?  Worsened   Are you having other symptoms that might be associated with depression or anxiety? No  Have you had a significant life event? No   Do you have any concerns with your use of alcohol or other drugs? No  Stopped the Lexapro after a few months    Social History     Tobacco Use    Smoking status: Never    Smokeless tobacco: Never   Vaping Use    Vaping Use: Never used   Substance Use Topics    Alcohol use: No    Drug use: No         1/28/2021     2:17 PM 2/25/2021     2:11 PM 10/4/2023     9:08 AM   PHQ   PHQ-9 Total Score 8 3 5   Q9: Thoughts of better off dead/self-harm past 2 weeks Not at all Not at all Not at all         1/28/2021     2:17 PM 2/25/2021     2:11 PM 10/4/2023    10:06 AM   BLAKE-7 SCORE   Total Score 11 8 15       Review of Systems   Constitutional, HEENT, cardiovascular, pulmonary, gi and gu systems are negative, except as otherwise noted.      Objective    /70 (BP Location: Right arm, Patient Position: Sitting, Cuff Size: Adult Large)   Pulse 67   Temp 98.3  F (36.8  C) (Tympanic)   Resp 18   Ht 1.88 m (6' 2\")   Wt 129.3 kg (285 lb)   SpO2 98%   BMI 36.59 kg/m    Body mass index is 36.59 kg/m .  Physical Exam   GENERAL: healthy, alert and no distress  HENT: normal cephalic/atraumatic, both ears: clear effusion, nose and mouth without ulcers or lesions, nasal mucosa edematous , oropharynx clear, oral mucous membranes moist, and sinuses: maxillary, frontal tenderness on right  NECK: no adenopathy  RESP: lungs clear to auscultation - no rales, rhonchi or wheezes  CV: regular rate and rhythm, normal S1 S2, no S3 or S4, no murmur  PSYCH: mentation " appears normal, affect normal/bright

## 2023-10-04 NOTE — PATIENT INSTRUCTIONS
Augmentin twice daily for 10 days    Azelastine nasal spray in the morning     Flonase nasal spray at bedtime-okay to stop after a month and continue on the Azelastine.  If symptoms resolve can trial off the Azelastine at that time.     Restart the Lexapro    Recheck in 2-3 months sooner if needed

## 2023-10-13 ENCOUNTER — MYC MEDICAL ADVICE (OUTPATIENT)
Dept: FAMILY MEDICINE | Facility: CLINIC | Age: 28
End: 2023-10-13
Payer: COMMERCIAL

## 2023-10-13 DIAGNOSIS — R09.82 POST-NASAL DRAINAGE: Primary | ICD-10-CM

## 2023-10-13 DIAGNOSIS — R09.81 CHRONIC NASAL CONGESTION: ICD-10-CM

## 2023-10-13 NOTE — TELEPHONE ENCOUNTER
Patient calls back the clinic, he is reporting he still has runny nose, congestion, nasal swelling, has to breath through his mouth, denies fever, has one day left of Augmentin, but says the medications prescribed did not help, wondering next steps.    Routing to Janet Blackman for review.      YANNICK To

## 2023-11-16 ENCOUNTER — OFFICE VISIT (OUTPATIENT)
Dept: OTOLARYNGOLOGY | Facility: OTHER | Age: 28
End: 2023-11-16
Payer: COMMERCIAL

## 2023-11-16 VITALS
DIASTOLIC BLOOD PRESSURE: 76 MMHG | SYSTOLIC BLOOD PRESSURE: 142 MMHG | WEIGHT: 289.9 LBS | HEIGHT: 74 IN | BODY MASS INDEX: 37.21 KG/M2 | TEMPERATURE: 97.4 F

## 2023-11-16 DIAGNOSIS — J34.3 NASAL TURBINATE HYPERTROPHY: ICD-10-CM

## 2023-11-16 DIAGNOSIS — J30.1 NON-SEASONAL ALLERGIC RHINITIS DUE TO POLLEN: Primary | ICD-10-CM

## 2023-11-16 DIAGNOSIS — R09.82 POST-NASAL DRAINAGE: ICD-10-CM

## 2023-11-16 DIAGNOSIS — J34.2 NASAL SEPTAL DEVIATION: ICD-10-CM

## 2023-11-16 DIAGNOSIS — R09.81 CHRONIC NASAL CONGESTION: ICD-10-CM

## 2023-11-16 PROCEDURE — 99204 OFFICE O/P NEW MOD 45 MIN: CPT | Performed by: OTOLARYNGOLOGY

## 2023-11-16 ASSESSMENT — ENCOUNTER SYMPTOMS
HEARTBURN: 0
HEMOPTYSIS: 0
SORE THROAT: 0
NAUSEA: 0
TINGLING: 0
BRUISES/BLEEDS EASILY: 0
HEADACHES: 0
SINUS PAIN: 0
CONSTITUTIONAL NEGATIVE: 1
SPUTUM PRODUCTION: 0
EYES NEGATIVE: 1
COUGH: 0
VOMITING: 0
STRIDOR: 0
DIZZINESS: 0

## 2023-11-16 NOTE — LETTER
11/16/2023         RE: Iván Burton  [address] [facility] [address]        Dear Colleague,    Thank you for referring your patient, Iván Burton, to the [facility]. Please see a copy of my visit note below.    HPI    This pleasant patient is having a nasal congestion for years. It has been going on year round. Describes post nasal drip. States facial pressure from time to time. No hx of runny nose, sneezing, and hyposmia. No itchy eyes or nose. Denies any otalgia, otorrhea, aural pressure or vertigo. Patient tried topical nasal sprays with limited benefit. Environmental allergies are noted. No hx of trauma or head and neck surgeries.  He has a hx of Perennial allergic rhinitis.    ENT Problem List  GERD (gastroesophageal reflux disease)  Attention deficit hyperactivity disorder (ADHD)  Generalized anxiety disorder  Chronic constipation  Mild major depression (H)  Moderate persistent asthma  Obesity  Morbid obesity (H)       Current Medications:  azelastine (ASTELIN) 0.1 % nasal spray  escitalopram (LEXAPRO) 10 MG tablet  fluticasone (FLONASE) 50 MCG/ACT nasal spray  omeprazole (PRILOSEC) 20 MG DR capsule  TYLENOL OR    Review of Systems   Constitutional: Negative.    HENT:  Positive for congestion. Negative for ear discharge, ear pain, hearing loss, nosebleeds, sinus pain, sore throat and tinnitus.    Eyes: Negative.    Respiratory:  Negative for cough, hemoptysis, sputum production and stridor.    Gastrointestinal:  Negative for heartburn, nausea and vomiting.   Skin: Negative.    Neurological:  Negative for dizziness, tingling and headaches.   Endo/Heme/Allergies:  Negative for environmental allergies. Does not bruise/bleed easily.         Physical Exam  Vitals and nursing note reviewed.   Constitutional:       Appearance: Normal appearance.   HENT:      Head: Normocephalic and atraumatic.      Jaw: There is normal jaw occlusion.      Right Ear: Hearing, tympanic membrane, ear  canal and external ear normal. No middle ear effusion.      Left Ear: Hearing, tympanic membrane, ear canal and external ear normal.  No middle ear effusion.      Nose: Septal deviation, mucosal edema, congestion and rhinorrhea present.      Right Turbinates: Swollen.      Left Turbinates: Swollen.      Mouth/Throat:      Mouth: Mucous membranes are moist.      Pharynx: Oropharynx is clear. Uvula midline.   Eyes:      Extraocular Movements: Extraocular movements intact.      Pupils: Pupils are equal, round, and reactive to light.   Neurological:      Mental Status: He is alert.       Septum is slightly deviated to the right.  Bilateral inferior turbinates are enlarged.  Post nasal drip was observed. No acute sinus infections detected.    A/P  The patient is having nasal congestion and post nasal drip. Options including medical and surgical ones were discussed. Topical nasal sprays including mild steroids, antihistaminic Azelastine were reviewed. He will continue with his Azelastine x2+ Fluticasone x1. The patient will be seen in the f/up for a consideration of allergic evaluation, a nasal/nasopharyngeal scopic evaluation or a further imaging studies.      Again, thank you for allowing me to participate in the care of your patient.        Sincerely,        Bob You MD

## 2023-11-16 NOTE — PROGRESS NOTES
HPI    This pleasant patient is having a nasal congestion for years. It has been going on year round. Describes post nasal drip. States facial pressure from time to time. No hx of runny nose, sneezing, and hyposmia. No itchy eyes or nose. Denies any otalgia, otorrhea, aural pressure or vertigo. Patient tried topical nasal sprays with limited benefit. Environmental allergies are noted. No hx of trauma or head and neck surgeries.  He has a hx of Perennial allergic rhinitis.    ENT Problem List  GERD (gastroesophageal reflux disease)  Attention deficit hyperactivity disorder (ADHD)  Generalized anxiety disorder  Chronic constipation  Mild major depression (H)  Moderate persistent asthma  Obesity  Morbid obesity (H)       Current Medications:  azelastine (ASTELIN) 0.1 % nasal spray  escitalopram (LEXAPRO) 10 MG tablet  fluticasone (FLONASE) 50 MCG/ACT nasal spray  omeprazole (PRILOSEC) 20 MG DR capsule  TYLENOL OR    Review of Systems   Constitutional: Negative.    HENT:  Positive for congestion. Negative for ear discharge, ear pain, hearing loss, nosebleeds, sinus pain, sore throat and tinnitus.    Eyes: Negative.    Respiratory:  Negative for cough, hemoptysis, sputum production and stridor.    Gastrointestinal:  Negative for heartburn, nausea and vomiting.   Skin: Negative.    Neurological:  Negative for dizziness, tingling and headaches.   Endo/Heme/Allergies:  Negative for environmental allergies. Does not bruise/bleed easily.         Physical Exam  Vitals and nursing note reviewed.   Constitutional:       Appearance: Normal appearance.   HENT:      Head: Normocephalic and atraumatic.      Jaw: There is normal jaw occlusion.      Right Ear: Hearing, tympanic membrane, ear canal and external ear normal. No middle ear effusion.      Left Ear: Hearing, tympanic membrane, ear canal and external ear normal.  No middle ear effusion.      Nose: Septal deviation, mucosal edema, congestion and rhinorrhea present.      Right  Turbinates: Swollen.      Left Turbinates: Swollen.      Mouth/Throat:      Mouth: Mucous membranes are moist.      Pharynx: Oropharynx is clear. Uvula midline.   Eyes:      Extraocular Movements: Extraocular movements intact.      Pupils: Pupils are equal, round, and reactive to light.   Neurological:      Mental Status: He is alert.       Septum is slightly deviated to the right.  Bilateral inferior turbinates are enlarged.  Post nasal drip was observed. No acute sinus infections detected.    A/P  The patient is having nasal congestion and post nasal drip. Options including medical and surgical ones were discussed. Topical nasal sprays including mild steroids, antihistaminic Azelastine were reviewed. He will continue with his Azelastine x2+ Fluticasone x1. The patient will be seen in the f/up for a consideration of allergic evaluation, a nasal/nasopharyngeal scopic evaluation or a further imaging studies.

## 2023-11-25 ENCOUNTER — HEALTH MAINTENANCE LETTER (OUTPATIENT)
Age: 28
End: 2023-11-25

## 2024-02-20 DIAGNOSIS — F32.0 MILD MAJOR DEPRESSION (H): ICD-10-CM

## 2024-02-20 DIAGNOSIS — F41.1 GENERALIZED ANXIETY DISORDER: ICD-10-CM

## 2024-02-20 RX ORDER — ESCITALOPRAM OXALATE 10 MG/1
10 TABLET ORAL DAILY
Qty: 30 TABLET | Refills: 0 | Status: SHIPPED | OUTPATIENT
Start: 2024-02-20 | End: 2024-04-05

## 2024-02-22 ENCOUNTER — OFFICE VISIT (OUTPATIENT)
Dept: OTOLARYNGOLOGY | Facility: OTHER | Age: 29
End: 2024-02-22
Payer: COMMERCIAL

## 2024-02-22 VITALS
SYSTOLIC BLOOD PRESSURE: 124 MMHG | BODY MASS INDEX: 37.6 KG/M2 | TEMPERATURE: 98.3 F | DIASTOLIC BLOOD PRESSURE: 78 MMHG | HEIGHT: 74 IN | WEIGHT: 293 LBS

## 2024-02-22 DIAGNOSIS — J30.1 NON-SEASONAL ALLERGIC RHINITIS DUE TO POLLEN: ICD-10-CM

## 2024-02-22 DIAGNOSIS — R09.81 CHRONIC NASAL CONGESTION: Primary | ICD-10-CM

## 2024-02-22 DIAGNOSIS — J34.2 NASAL SEPTAL DEVIATION: ICD-10-CM

## 2024-02-22 DIAGNOSIS — J34.3 NASAL TURBINATE HYPERTROPHY: ICD-10-CM

## 2024-02-22 PROCEDURE — 99214 OFFICE O/P EST MOD 30 MIN: CPT | Performed by: OTOLARYNGOLOGY

## 2024-02-22 ASSESSMENT — ENCOUNTER SYMPTOMS
CONSTITUTIONAL NEGATIVE: 1
GASTROINTESTINAL NEGATIVE: 1
RESPIRATORY NEGATIVE: 1
EYES NEGATIVE: 1

## 2024-02-22 ASSESSMENT — PAIN SCALES - GENERAL: PAINLEVEL: NO PAIN (0)

## 2024-02-22 NOTE — LETTER
"    2/22/2024         RE: Iván Burton  [address] [facility] [address]        Dear Colleague,    Thank you for referring your patient, Iván Burton, to the [facility]. Please see a copy of my visit note below.    Chief Complaint   Patient presents with     Follow Up     Sinusitis      PCP: No Ref-Primary, Physician     Referring Provider: No ref. provider found    /78 (BP Location: Right arm, Patient Position: Sitting, Cuff Size: Adult Regular)   Temp 98.3  F (36.8  C) (Temporal)   Ht 1.88 m (6' 2\")   Wt 132.9 kg (293 lb)   BMI 37.62 kg/m      ENT Problem List:  Patient Active Problem List   Diagnosis Code     Attention deficit hyperactivity disorder (ADHD) F90.9     Generalized anxiety disorder F41.1     Chronic constipation K59.09     Mild major depression (H) F32.9     GERD (gastroesophageal reflux disease) K21.9     Moderate persistent asthma J45.40     Obesity E66.9     Morbid obesity (H) E66.01      Current Medications:  Current Outpatient Medications   Medication     escitalopram (LEXAPRO) 10 MG tablet     azelastine (ASTELIN) 0.1 % nasal spray     fluticasone (FLONASE) 50 MCG/ACT nasal spray     omeprazole (PRILOSEC) 20 MG DR capsule     TYLENOL OR     No current facility-administered medications for this visit.     HPI  Pleasant 28 year old male presents today as a(n) established patient for follow-up for sinuitis. He was last seen on 11/16/2023. He feels no improvement with nasal sprays. He reports post nasal drip, nasal congestion, environmental allergies, and facial pressure. He states that he feels facial pressure daily and it can get \"intense\". He takes Claritin for environmental allergies with no improvement. He is scheduled to get allergy testing at the end of March.  He denies sneezing, coughing, seasonal fluctuations     Review of Systems   Constitutional: Negative.    HENT:  Positive for congestion.    Eyes: Negative.    Respiratory: Negative.   "   Gastrointestinal: Negative.    Skin: Negative.    Endo/Heme/Allergies:  Positive for environmental allergies.       Physical Exam  Vitals and nursing note reviewed.   Constitutional:       Appearance: Normal appearance.   HENT:      Head: Normocephalic and atraumatic.      Jaw: There is normal jaw occlusion.      Right Ear: Hearing, tympanic membrane and ear canal normal.      Left Ear: Hearing, tympanic membrane and ear canal normal.      Nose: Septal deviation and congestion present. No mucosal edema or rhinorrhea.      Right Nostril: No occlusion.      Left Nostril: No occlusion.      Right Turbinates: Swollen. Not enlarged.      Left Turbinates: Swollen. Not enlarged.      Right Sinus: No maxillary sinus tenderness or frontal sinus tenderness.      Left Sinus: No maxillary sinus tenderness or frontal sinus tenderness.      Mouth/Throat:      Mouth: Mucous membranes are moist.      Pharynx: Oropharynx is clear. Uvula midline.   Eyes:      Extraocular Movements: Extraocular movements intact.      Pupils: Pupils are equal, round, and reactive to light.   Neurological:      Mental Status: He is alert.       A/P  This pleasant patient has a deviated septum to the left side, nasal turbinate enlargements bilaterally and continues to experience nasal congestion, post nasal drip, allergies with use of nasal sprays. There are no acute sinus infections present. Options were discussed, and the patient decided to go through with surgery. Therefore, an order is placed for a septoplasty  with turbinoplasty. Pros and cons, recovery, questions, and concerns were thoroughly addressed. A CT Sinus w/o Contrast is ordered, and the patient is informed that they should get this completed before the surgery to be sure that there is no evidence of infection or anything else unusual. He is informed that he will receive phone calls to schedule his CT and surgery.    Scribe/Staff:    Scribe Disclosure:   I, Alize Sahni, am serving as a  scribe; to document services personally performed by Bob You MD based on data collection and the provider's statements to me.     Provider Disclosure:  I agree with above History, Review of Systems, Physical exam and Plan.  I have reviewed the content of the documentation and have edited it as needed. I have personally performed the services documented here and the documentation accurately represents those services and the decisions I have made.      Electronically signed by:  Bob You MD      Again, thank you for allowing me to participate in the care of your patient.        Sincerely,        Bob You MD

## 2024-02-22 NOTE — PROGRESS NOTES
"Chief Complaint   Patient presents with    Follow Up    Sinusitis      PCP: No Ref-Primary, Physician     Referring Provider: No ref. provider found    /78 (BP Location: Right arm, Patient Position: Sitting, Cuff Size: Adult Regular)   Temp 98.3  F (36.8  C) (Temporal)   Ht 1.88 m (6' 2\")   Wt 132.9 kg (293 lb)   BMI 37.62 kg/m      ENT Problem List:  Patient Active Problem List   Diagnosis Code    Attention deficit hyperactivity disorder (ADHD) F90.9    Generalized anxiety disorder F41.1    Chronic constipation K59.09    Mild major depression (H) F32.9    GERD (gastroesophageal reflux disease) K21.9    Moderate persistent asthma J45.40    Obesity E66.9    Morbid obesity (H) E66.01      Current Medications:  Current Outpatient Medications   Medication    escitalopram (LEXAPRO) 10 MG tablet    azelastine (ASTELIN) 0.1 % nasal spray    fluticasone (FLONASE) 50 MCG/ACT nasal spray    omeprazole (PRILOSEC) 20 MG DR capsule    TYLENOL OR     No current facility-administered medications for this visit.     HPI  Pleasant 28 year old male presents today as a(n) established patient for follow-up for sinuitis. He was last seen on 11/16/2023. He feels no improvement with nasal sprays. He reports post nasal drip, nasal congestion, environmental allergies, and facial pressure. He states that he feels facial pressure daily and it can get \"intense\". He takes Claritin for environmental allergies with no improvement. He is scheduled to get allergy testing at the end of March.  He denies sneezing, coughing, seasonal fluctuations     Review of Systems   Constitutional: Negative.    HENT:  Positive for congestion.    Eyes: Negative.    Respiratory: Negative.     Gastrointestinal: Negative.    Skin: Negative.    Endo/Heme/Allergies:  Positive for environmental allergies.       Physical Exam  Vitals and nursing note reviewed.   Constitutional:       Appearance: Normal appearance.   HENT:      Head: Normocephalic and atraumatic. "      Jaw: There is normal jaw occlusion.      Right Ear: Hearing, tympanic membrane and ear canal normal.      Left Ear: Hearing, tympanic membrane and ear canal normal.      Nose: Septal deviation and congestion present. No mucosal edema or rhinorrhea.      Right Nostril: No occlusion.      Left Nostril: No occlusion.      Right Turbinates: Swollen. Not enlarged.      Left Turbinates: Swollen. Not enlarged.      Right Sinus: No maxillary sinus tenderness or frontal sinus tenderness.      Left Sinus: No maxillary sinus tenderness or frontal sinus tenderness.      Mouth/Throat:      Mouth: Mucous membranes are moist.      Pharynx: Oropharynx is clear. Uvula midline.   Eyes:      Extraocular Movements: Extraocular movements intact.      Pupils: Pupils are equal, round, and reactive to light.   Neurological:      Mental Status: He is alert.       A/P  This pleasant patient has a deviated septum to the left side, nasal turbinate enlargements bilaterally and continues to experience nasal congestion, post nasal drip, allergies with use of nasal sprays. There are no acute sinus infections present. Options were discussed, and the patient decided to go through with surgery. Therefore, an order is placed for a septoplasty  with turbinoplasty. Pros and cons, recovery, questions, and concerns were thoroughly addressed. A CT Sinus w/o Contrast is ordered, and the patient is informed that they should get this completed before the surgery to be sure that there is no evidence of infection or anything else unusual. He is informed that he will receive phone calls to schedule his CT and surgery.    Scribe/Staff:    Scribe Disclosure:   I, Alize Sahni, am serving as a scribe; to document services personally performed by Bob You MD based on data collection and the provider's statements to me.     Provider Disclosure:  I agree with above History, Review of Systems, Physical exam and Plan.  I have reviewed the content of  the documentation and have edited it as needed. I have personally performed the services documented here and the documentation accurately represents those services and the decisions I have made.      Electronically signed by:  Bob You MD

## 2024-02-23 ENCOUNTER — TELEPHONE (OUTPATIENT)
Dept: OTOLARYNGOLOGY | Facility: CLINIC | Age: 29
End: 2024-02-23
Payer: COMMERCIAL

## 2024-02-23 PROBLEM — J34.3 NASAL TURBINATE HYPERTROPHY: Status: ACTIVE | Noted: 2024-02-22

## 2024-02-23 PROBLEM — J34.2 NASAL SEPTAL DEVIATION: Status: ACTIVE | Noted: 2024-02-22

## 2024-02-23 NOTE — TELEPHONE ENCOUNTER
Date Scheduled: 7/18/2024  Facility: Surgery Locations: [facility]  Surgeon: Dr. Bob You   Post-op appointment scheduled: 7/25/2024 at 7:40am [facility]   scheduled?: No  Surgery packet/instructions confirmed received?   To be sent in the [place] mail  Pre op physical/PAC appointment: [facility]  Special Considerations: n/a    Paola Mancilla on 2/23/2024 at 10:48 AM

## 2024-03-16 ENCOUNTER — HOSPITAL ENCOUNTER (OUTPATIENT)
Dept: CT IMAGING | Facility: CLINIC | Age: 29
Discharge: HOME | End: 2024-03-16
Attending: OTOLARYNGOLOGY | Admitting: OTOLARYNGOLOGY
Payer: COMMERCIAL

## 2024-03-16 DIAGNOSIS — J34.3 NASAL TURBINATE HYPERTROPHY: ICD-10-CM

## 2024-03-16 DIAGNOSIS — J34.2 NASAL SEPTAL DEVIATION: ICD-10-CM

## 2024-03-16 DIAGNOSIS — R09.81 CHRONIC NASAL CONGESTION: ICD-10-CM

## 2024-03-16 DIAGNOSIS — J30.1 NON-SEASONAL ALLERGIC RHINITIS DUE TO POLLEN: ICD-10-CM

## 2024-03-16 PROCEDURE — 70486 CT MAXILLOFACIAL W/O DYE: CPT

## 2024-04-05 ENCOUNTER — OFFICE VISIT (OUTPATIENT)
Dept: FAMILY MEDICINE | Facility: CLINIC | Age: 29
End: 2024-04-05
Payer: COMMERCIAL

## 2024-04-05 VITALS
RESPIRATION RATE: 20 BRPM | BODY MASS INDEX: 36.7 KG/M2 | HEIGHT: 74 IN | SYSTOLIC BLOOD PRESSURE: 130 MMHG | HEART RATE: 57 BPM | OXYGEN SATURATION: 99 % | TEMPERATURE: 98.4 F | DIASTOLIC BLOOD PRESSURE: 78 MMHG | WEIGHT: 286 LBS

## 2024-04-05 DIAGNOSIS — K21.9 GASTROESOPHAGEAL REFLUX DISEASE WITHOUT ESOPHAGITIS: ICD-10-CM

## 2024-04-05 DIAGNOSIS — Z13.1 SCREENING FOR DIABETES MELLITUS: ICD-10-CM

## 2024-04-05 DIAGNOSIS — F41.1 GENERALIZED ANXIETY DISORDER: ICD-10-CM

## 2024-04-05 DIAGNOSIS — Z13.6 CARDIOVASCULAR SCREENING; LDL GOAL LESS THAN 130: ICD-10-CM

## 2024-04-05 DIAGNOSIS — E66.01 MORBID OBESITY (H): ICD-10-CM

## 2024-04-05 DIAGNOSIS — Z00.00 ROUTINE GENERAL MEDICAL EXAMINATION AT A HEALTH CARE FACILITY: ICD-10-CM

## 2024-04-05 DIAGNOSIS — F32.0 MILD MAJOR DEPRESSION (H): Primary | ICD-10-CM

## 2024-04-05 PROBLEM — E66.9 OBESITY: Status: RESOLVED | Noted: 2020-03-09 | Resolved: 2024-04-05

## 2024-04-05 LAB
CHOLEST SERPL-MCNC: 126 MG/DL
FASTING STATUS PATIENT QL REPORTED: NO
HBA1C MFR BLD: 5 % (ref 0–5.6)
HDLC SERPL-MCNC: 43 MG/DL
LDLC SERPL CALC-MCNC: 70 MG/DL
NONHDLC SERPL-MCNC: 83 MG/DL
TRIGL SERPL-MCNC: 63 MG/DL

## 2024-04-05 PROCEDURE — 83036 HEMOGLOBIN GLYCOSYLATED A1C: CPT | Performed by: PHYSICIAN ASSISTANT

## 2024-04-05 PROCEDURE — 36415 COLL VENOUS BLD VENIPUNCTURE: CPT | Performed by: PHYSICIAN ASSISTANT

## 2024-04-05 PROCEDURE — 99395 PREV VISIT EST AGE 18-39: CPT | Performed by: PHYSICIAN ASSISTANT

## 2024-04-05 PROCEDURE — 99214 OFFICE O/P EST MOD 30 MIN: CPT | Mod: 25 | Performed by: PHYSICIAN ASSISTANT

## 2024-04-05 PROCEDURE — 80061 LIPID PANEL: CPT | Performed by: PHYSICIAN ASSISTANT

## 2024-04-05 RX ORDER — ESCITALOPRAM OXALATE 20 MG/1
20 TABLET ORAL DAILY
Qty: 90 TABLET | Refills: 3 | Status: SHIPPED | OUTPATIENT
Start: 2024-04-05

## 2024-04-05 SDOH — HEALTH STABILITY: PHYSICAL HEALTH: ON AVERAGE, HOW MANY DAYS PER WEEK DO YOU ENGAGE IN MODERATE TO STRENUOUS EXERCISE (LIKE A BRISK WALK)?: 5 DAYS

## 2024-04-05 ASSESSMENT — ANXIETY QUESTIONNAIRES
GAD7 TOTAL SCORE: 7
2. NOT BEING ABLE TO STOP OR CONTROL WORRYING: SEVERAL DAYS
1. FEELING NERVOUS, ANXIOUS, OR ON EDGE: SEVERAL DAYS
3. WORRYING TOO MUCH ABOUT DIFFERENT THINGS: SEVERAL DAYS
IF YOU CHECKED OFF ANY PROBLEMS ON THIS QUESTIONNAIRE, HOW DIFFICULT HAVE THESE PROBLEMS MADE IT FOR YOU TO DO YOUR WORK, TAKE CARE OF THINGS AT HOME, OR GET ALONG WITH OTHER PEOPLE: NOT DIFFICULT AT ALL
5. BEING SO RESTLESS THAT IT IS HARD TO SIT STILL: NOT AT ALL
7. FEELING AFRAID AS IF SOMETHING AWFUL MIGHT HAPPEN: SEVERAL DAYS
GAD7 TOTAL SCORE: 7
6. BECOMING EASILY ANNOYED OR IRRITABLE: MORE THAN HALF THE DAYS

## 2024-04-05 ASSESSMENT — PAIN SCALES - GENERAL: PAINLEVEL: NO PAIN (0)

## 2024-04-05 ASSESSMENT — SOCIAL DETERMINANTS OF HEALTH (SDOH): HOW OFTEN DO YOU GET TOGETHER WITH FRIENDS OR RELATIVES?: ONCE A WEEK

## 2024-04-05 ASSESSMENT — ASTHMA QUESTIONNAIRES
ACT_TOTALSCORE: 24
QUESTION_1 LAST FOUR WEEKS HOW MUCH OF THE TIME DID YOUR ASTHMA KEEP YOU FROM GETTING AS MUCH DONE AT WORK, SCHOOL OR AT HOME: NONE OF THE TIME
QUESTION_4 LAST FOUR WEEKS HOW OFTEN HAVE YOU USED YOUR RESCUE INHALER OR NEBULIZER MEDICATION (SUCH AS ALBUTEROL): NOT AT ALL
QUESTION_5 LAST FOUR WEEKS HOW WOULD YOU RATE YOUR ASTHMA CONTROL: WELL CONTROLLED
QUESTION_3 LAST FOUR WEEKS HOW OFTEN DID YOUR ASTHMA SYMPTOMS (WHEEZING, COUGHING, SHORTNESS OF BREATH, CHEST TIGHTNESS OR PAIN) WAKE YOU UP AT NIGHT OR EARLIER THAN USUAL IN THE MORNING: NOT AT ALL
ACT_TOTALSCORE: 24
QUESTION_2 LAST FOUR WEEKS HOW OFTEN HAVE YOU HAD SHORTNESS OF BREATH: NOT AT ALL

## 2024-04-05 ASSESSMENT — ENCOUNTER SYMPTOMS: NERVOUS/ANXIOUS: 1

## 2024-04-05 ASSESSMENT — PATIENT HEALTH QUESTIONNAIRE - PHQ9
SUM OF ALL RESPONSES TO PHQ QUESTIONS 1-9: 7
SUM OF ALL RESPONSES TO PHQ QUESTIONS 1-9: 7
5. POOR APPETITE OR OVEREATING: SEVERAL DAYS
10. IF YOU CHECKED OFF ANY PROBLEMS, HOW DIFFICULT HAVE THESE PROBLEMS MADE IT FOR YOU TO DO YOUR WORK, TAKE CARE OF THINGS AT HOME, OR GET ALONG WITH OTHER PEOPLE: SOMEWHAT DIFFICULT

## 2024-04-05 NOTE — NURSING NOTE
"Chief Complaint   Patient presents with    Depression    Anxiety    Physical       Initial Pulse 57   Temp 98.4  F (36.9  C)   Resp 20   Ht 1.88 m (6' 2.02\")   Wt 129.7 kg (286 lb)   SpO2 99%   BMI 36.70 kg/m   Estimated body mass index is 36.7 kg/m  as calculated from the following:    Height as of this encounter: 1.88 m (6' 2.02\").    Weight as of this encounter: 129.7 kg (286 lb).    Patient presents to the clinic using No DME    Is there anyone who you would like to be able to receive your results? No  If yes have patient fill out KENNY      "

## 2024-04-05 NOTE — PROGRESS NOTES
"Preventive Care Visit  [facility]  Iván Delgado PA-C, Family Medicine  Apr 5, 2024      Assessment & Plan   Mild major depression (H24)  Generalized anxiety disorder  Improved on 10 mg of Lexapro but not at goal. Shared decision making was discussed. Will increase to 20 mg daily and follow-up as needed based on symptoms.   - escitalopram (LEXAPRO) 20 MG tablet; Take 1 tablet (20 mg) by mouth daily -due for recheck in clinic    Gastroesophageal reflux disease without esophagitis  Severe per his report. Recommended to restart Prilosec. Evaluate with an Upper GI series. Encouraged lifestyle changes.   - XR Esophagram w Upper GI; Future    Morbid obesity (H)  Body mass index is 36.7 kg/m .. Associated with GERD, depression which would improve with weight loss. Encouraged healthy lifestyle changes.     Routine general medical examination at a health care facility  28 yr old here for physical exam. Last physical exam done several years ago. Discussed preventative screenings which are updated below. Counseled on immunizations and healthy lifestyle. Follow-up in 1 year for repeat physical exam.     CARDIOVASCULAR SCREENING; LDL GOAL LESS THAN 130  Due for screening.   - Lipid panel reflex to direct LDL Fasting; Future    Screening for diabetes mellitus  Due for screening.   - Hemoglobin A1c; Future    Patient has been advised of split billing requirements and indicates understanding: Yes    BMI  Estimated body mass index is 36.7 kg/m  as calculated from the following:    Height as of this encounter: 1.88 m (6' 2.02\").    Weight as of this encounter: 129.7 kg (286 lb).     Counseling  Appropriate preventive services were discussed with this patient, including applicable screening as appropriate for fall prevention, nutrition, physical activity, Tobacco-use cessation, weight loss and cognition.  Checklist reviewing preventive services available has been given to the patient.  Reviewed patient's " diet, addressing concerns and/or questions.   The patient was instructed to see the dentist every 6 months.   The patient's PHQ-9 score is consistent with mild depression. He was provided with information regarding depression.     Susanna Minor is a 28 year old, presenting for the following:  Depression, Anxiety, and Physical        4/5/2024     1:19 PM   Additional Questions   Roomed by glendy gaona cma        Health Care Directive  Patient does not have a Health Care Directive or Living Will: Discussed advance care planning with patient; information given to patient to review.    Anxiety    Depression and Anxiety   How are you doing with your depression since your last visit? Improved   How are you doing with your anxiety since your last visit?  Improved   Are you having other symptoms that might be associated with depression or anxiety? Yes:  feeling down on himself   Have you had a significant life event? No   Do you have any concerns with your use of alcohol or other drugs? No    Social History     Tobacco Use    Smoking status: Never    Smokeless tobacco: Never   Vaping Use    Vaping Use: Never used   Substance Use Topics    Alcohol use: No    Drug use: No         2/25/2021     2:11 PM 10/4/2023     9:08 AM 4/5/2024     1:05 PM   PHQ   PHQ-9 Total Score 3 5 7   Q9: Thoughts of better off dead/self-harm past 2 weeks Not at all Not at all Not at all         2/25/2021     2:11 PM 10/4/2023    10:06 AM 4/5/2024     1:24 PM   BLAKE-7 SCORE   Total Score 8 15 7       Suicide Assessment Five-step Evaluation and Treatment (SAFE-T)        4/5/2024   General Health   How would you rate your overall physical health? Good   Feel stress (tense, anxious, or unable to sleep) To some extent   (!) STRESS CONCERN      4/5/2024   Nutrition   Three or more servings of calcium each day? Yes   Diet: Regular (no restrictions)   How many servings of fruit and vegetables per day? (!) 2-3   How many sweetened beverages each day? 0-1  "        4/5/2024   Exercise   Days per week of moderate/strenous exercise 5 days         4/5/2024   Social Factors   Frequency of gathering with friends or relatives Once a week   Worry food won't last until get money to buy more No   Food not last or not have enough money for food? No   Do you have housing?  No   Are you worried about losing your housing? No   Lack of transportation? No   Unable to get utilities (heat,electricity)? No   Want help with housing or utility concern? No   (!) HOUSING CONCERN PRESENT      4/5/2024   Dental   Dentist two times every year? (!) NO         4/5/2024   TB Screening   Were you born outside of the [place]? No     Today's PHQ-9 Score:       4/5/2024     1:05 PM   PHQ-9 SCORE   PHQ-9 Total Score MyChart 7 (Mild depression)   PHQ-9 Total Score 7         4/5/2024   Substance Use   Alcohol more than 3/day or more than 7/wk No   Do you use any other substances recreationally? No     Social History     Tobacco Use    Smoking status: Never    Smokeless tobacco: Never   Vaping Use    Vaping Use: Never used   Substance Use Topics    Alcohol use: No    Drug use: No             4/5/2024   One time HIV Screening   Previous HIV test? I don't know         4/5/2024   STI Screening   New sexual partner(s) since last STI/HIV test? No         4/5/2024   Contraception/Family Planning   Questions about contraception or family planning No        Reviewed and updated as needed this visit by Provider                  Review of Systems  See HPI      Objective    Exam  /78 (BP Location: Right arm, Patient Position: Sitting, Cuff Size: Adult Large)   Pulse 57   Temp 98.4  F (36.9  C)   Resp 20   Ht 1.88 m (6' 2.02\")   Wt 129.7 kg (286 lb)   SpO2 99%   BMI 36.70 kg/m     Estimated body mass index is 36.7 kg/m  as calculated from the following:    Height as of this encounter: 1.88 m (6' 2.02\").    Weight as of this encounter: 129.7 kg (286 lb).    Physical Exam  Constitutional: healthy, alert, and " no distress  Head: Normocephalic. Atraumatic  Eyes: No conjunctival injection, sclera anicteric  Neck: supple, no thyromegaly, nodules or asymmetry of the thyroid. No cervical LAD.  Cardiovascular: RRR. No murmurs, clicks, gallops, or rubs. No peripheral edema.   Respiratory: No resp distress. Lungs CTAB bilaterally.   Musculoskeletal: extremities normal- no gross deformities noted, and normal muscle tone  Skin: no suspicious lesions or rashes  Neurologic: Gait normal. CN 2-12 grossly intact  Psychiatric: mentation appears normal and affect normal/bright     Signed Electronically by: Iván Delgado PA-C    Answers submitted by the patient for this visit:  Patient Health Questionnaire (Submitted on 4/5/2024)  If you checked off any problems, how difficult have these problems made it for you to do your work, take care of things at home, or get along with other people?: Somewhat difficult  PHQ9 TOTAL SCORE: 7

## 2024-04-05 NOTE — PATIENT INSTRUCTIONS
Preventive Care Advice   This is general advice given by our system to help you stay healthy. However, your care team may have specific advice just for you. Please talk to your care team about your preventive care needs.  Nutrition  Eat 5 or more servings of fruits and vegetables each day.  Try wheat bread, brown rice and whole grain pasta (instead of white bread, rice, and pasta).  Get enough calcium and vitamin D. Check the label on foods and aim for 100% of the RDA (recommended daily allowance).  Lifestyle  Exercise at least 150 minutes each week   (30 minutes a day, 5 days a week).  Do muscle strengthening activities 2 days a week. These help control your weight and prevent disease.  No smoking.  Wear sunscreen to prevent skin cancer.  Have a dental exam and cleaning every 6 months.  Yearly exams  See your health care team every year to talk about:  Any changes in your health.  Any medicines your care team has prescribed.  Preventive care, family planning, and ways to prevent chronic diseases.  Shots (vaccines)   HPV shots (up to age 26), if you've never had them before.  Hepatitis B shots (up to age 59), if you've never had them before.  COVID-19 shot: Get this shot when it's due.  Flu shot: Get a flu shot every year.  Tetanus shot: Get a tetanus shot every 10 years.  Pneumococcal, hepatitis A, and RSV shots: Ask your care team if you need these based on your risk.  Shingles shot (for age 50 and up).  General health tests  Diabetes screening:  Starting at age 35, Get screened for diabetes at least every 3 years.  If you are younger than age 35, ask your care team if you should be screened for diabetes.  Cholesterol test: At age 39, start having a cholesterol test every 5 years, or more often if advised.  Bone density scan (DEXA): At age 50, ask your care team if you should have this scan for osteoporosis (brittle bones).  Hepatitis C: Get tested at least once in your life.  STIs (sexually transmitted  infections)  Before age 24: Ask your care team if you should be screened for STIs.  After age 24: Get screened for STIs if you're at risk. You are at risk for STIs (including HIV) if:  You are sexually active with more than one person.  You don't use condoms every time.  You or a partner was diagnosed with a sexually transmitted infection.  If you are at risk for HIV, ask about PrEP medicine to prevent HIV.  Get tested for HIV at least once in your life, whether you are at risk for HIV or not.  Cancer screening tests  Cervical cancer screening: If you have a cervix, begin getting regular cervical cancer screening tests at age 21. Most people who have regular screenings with normal results can stop after age 65. Talk about this with your provider.  Breast cancer scan (mammogram): If you've ever had breasts, begin having regular mammograms starting at age 40. This is a scan to check for breast cancer.  Colon cancer screening: It is important to start screening for colon cancer at age 45.  Have a colonoscopy test every 10 years (or more often if you're at risk) Or, ask your provider about stool tests like a FIT test every year or Cologuard test every 3 years.  To learn more about your testing options, visit: https://www.[employer]/523630.pdf.  For help making a decision, visit: https://bit.ly/id01373.  Prostate cancer screening test: If you have a prostate and are age 55 to 69, ask your provider if you would benefit from a yearly prostate cancer screening test.  Lung cancer screening: If you are a current or former smoker age 50 to 80, ask your care team if ongoing lung cancer screenings are right for you.  For informational purposes only. Not to replace the advice of your health care provider. Copyright   2023 [employer]. All rights reserved. Clinically reviewed by the [facility]. Mingly [id number] - REV 01/24.    Learning About Stress  What is stress?     Stress is your  body's response to a hard situation. Your body can have a physical, emotional, or mental response. Stress is a fact of life for most people, and it affects everyone differently. What causes stress for you may not be stressful for someone else.  A lot of things can cause stress. You may feel stress when you go on a job interview, take a test, or run a race. This kind of short-term stress is normal and even useful. It can help you if you need to work hard or react quickly. For example, stress can help you finish an important job on time.  Long-term stress is caused by ongoing stressful situations or events. Examples of long-term stress include long-term health problems, ongoing problems at work, or conflicts in your family. Long-term stress can harm your health.  How does stress affect your health?  When you are stressed, your body responds as though you are in danger. It makes hormones that speed up your heart, make you breathe faster, and give you a burst of energy. This is called the fight-or-flight stress response. If the stress is over quickly, your body goes back to normal and no harm is done.  But if stress happens too often or lasts too long, it can have bad effects. Long-term stress can make you more likely to get sick, and it can make symptoms of some diseases worse. If you tense up when you are stressed, you may develop neck, shoulder, or low back pain. Stress is linked to high blood pressure and heart disease.  Stress also harms your emotional health. It can make you luis, tense, or depressed. Your relationships may suffer, and you may not do well at work or school.  What can you do to manage stress?  You can try these things to help manage stress:   Do something active. Exercise or activity can help reduce stress. Walking is a great way to get started. Even everyday activities such as housecleaning or yard work can help.  Try yoga or denzel chi. These techniques combine exercise and meditation. You may need  some training at first to learn them.  Do something you enjoy. For example, listen to music or go to a movie. Practice your hobby or do volunteer work.  Meditate. This can help you relax, because you are not worrying about what happened before or what may happen in the future.  Do guided imagery. Imagine yourself in any setting that helps you feel calm. You can use online videos, books, or a teacher to guide you.  Do breathing exercises. For example:  From a standing position, bend forward from the waist with your knees slightly bent. Let your arms dangle close to the floor.  Breathe in slowly and deeply as you return to a standing position. Roll up slowly and lift your head last.  Hold your breath for just a few seconds in the standing position.  Breathe out slowly and bend forward from the waist.  Let your feelings out. Talk, laugh, cry, and express anger when you need to. Talking with supportive friends or family, a counselor, or a marissa leader about your feelings is a healthy way to relieve stress. Avoid discussing your feelings with people who make you feel worse.  Write. It may help to write about things that are bothering you. This helps you find out how much stress you feel and what is causing it. When you know this, you can find better ways to cope.  What can you do to prevent stress?  You might try some of these things to help prevent stress:  Manage your time. This helps you find time to do the things you want and need to do.  Get enough sleep. Your body recovers from the stresses of the day while you are sleeping.  Get support. Your family, friends, and community can make a difference in how you experience stress.  Limit your news feed. Avoid or limit time on social media or news that may make you feel stressed.  Do something active. Exercise or activity can help reduce stress. Walking is a great way to get started.  Where can you learn more?  Go to https://www.healthwise.net/patiented  Enter N032 in the  "search box to learn more about \"Learning About Stress.\"  Current as of: October 24, 2023               Content Version: 14.0    1040-5042 [employer].   Care instructions adapted under license by your healthcare professional. If you have questions about a medical condition or this instruction, always ask your healthcare professional. [employer] disclaims any warranty or liability for your use of this information.      Learning About Depression Screening  What is depression screening?  Depression screening is a way to see if you have depression symptoms. It may be done by a doctor or counselor. It's often part of a routine checkup. That's because your mental health is just as important as your physical health.  Depression is a mental health condition that affects how you feel, think, and act. You may:  Have less energy.  Lose interest in your daily activities.  Feel sad and grouchy for a long time.  Depression is very common. It affects people of all ages.  Many things can lead to depression. Some people become depressed after they have a stroke or find out they have a major illness like cancer or heart disease. The death of a loved one or a breakup may lead to depression. It can run in families. Most experts believe that a combination of inherited genes and stressful life events can cause it.  What happens during screening?  You may be asked to fill out a form about your depression symptoms. You and the doctor will discuss your answers. The doctor may ask you more questions to learn more about how you think, act, and feel.  What happens after screening?  If you have symptoms of depression, your doctor will talk to you about your options.  Doctors usually treat depression with medicines or counseling. Often, combining the two works best. Many people don't get help because they think that they'll get over the depression on their own. But people with depression may not get better unless they " "get treatment.  The cause of depression is not well understood. There may be many factors involved. But if you have depression, it's not your fault.  A serious symptom of depression is thinking about death or suicide. If you or someone you care about talks about this or about feeling hopeless, get help right away.  It's important to know that depression can be treated. Medicine, counseling, and self-care may help.  Where can you learn more?  Go to https://www.myFairPartner.net/patiented  Enter T185 in the search box to learn more about \"Learning About Depression Screening.\"  Current as of: June 24, 2023               Content Version: 14.0    0083-9881 [employer].   Care instructions adapted under license by your healthcare professional. If you have questions about a medical condition or this instruction, always ask your healthcare professional. [employer] disclaims any warranty or liability for your use of this information.      "

## 2024-04-05 NOTE — COMMUNITY RESOURCES LIST (ENGLISH)
April 5, 2024           YOUR PERSONALIZED LIST OF SERVICES & PROGRAMS           & SHELTER    Housing      [facility] Emergency Housing  [address] [place] [address] (Distance: 20.5 miles)  Phone: [phone]) [phone]  Website: http://wwwProfit Point  Language: English  Fee: Free      [facility] - Housing Stabilization Services  Phone: (566) 584-2492  Website: https://TORIA/Housing-Stabilization.html  Language: English  Hours: Mon 9:00 AM - 5:00 PM Tue 9:00 AM - 5:00 PM Wed 9:00 AM - 5:00 PM Thu 9:00 AM - 5:00 PM Fri 9:00 AM - 5:00 PM  Fee: Insurance  Accessibility: Deaf or hard of hearing, Translation services    Case Management      [employer] - Housing search assistance  [address] [place] [address] (Distance: 24.4 miles)  Phone: (797) 681-9541  Website: https://[employer]/  Language: English  Fee: Free      [employer] - Housing Stabilization Services  Phone: (207) 587-1337  Website: https://homebasemn.com/  Language: English  Hours: Mon 8:00 AM - 4:00 PM Tue 8:00 AM - 4:00 PM Wed 8:00 AM - 4:00 PM Thu 8:00 AM - 4:00 PM Fri 8:00 AM - 4:00 PM  Fee: Free  Accessibility: Blind accommodation, Deaf or hard of hearing  Transportation Options: Free transportation    Drop-In Services      [facility] [place] [employer] [facility]  [address] [place] [address] (Distance: 23.3 miles)  Language: English  Fee: Free      [employer] - LAUNDRY LOVE  Website: http://www.laundrylove.org               IMPORTANT NUMBERS & WEBSITES        Emergency Services  911  .   United Way  211 http://211unitedway.org  .   Poison Control  (603) 527-7320 http://[place]poison.org http://wis[place]poison.org  .     Suicide and Crisis Lifeline  988 http://988lifeline.org  .   Childhelp [place] Child Abuse Hotline  269-390-9904 http://Childhelphotline.org   .   National Sexual Assault  Hotline  (217) 467-1067 (HOPE) http://Brigaden.Zaranga   .     National Runaway Safeline  (944) 461-9897 (RUNAWAY) http://Vizimax.Zaranga  .   Pregnancy & Postpartum Support  Call/text 077-412-4924  [place]: http://ppsupportmn.org  [place]: http://psichapters.com/wi  .   Substance Abuse National Helpline ([facility])  635-135-HELP (3547) http://Findtreatment.gov   .                DISCLAIMER: These resources have been generated via the [employer]. [employer] does not endorse any service providers mentioned in this resource list. [employer] does not guarantee that the services mentioned in this resource list will be available to you or will improve your health or wellness.    [facility]

## 2024-04-12 ENCOUNTER — PREP FOR PROCEDURE (OUTPATIENT)
Dept: OTOLARYNGOLOGY | Facility: CLINIC | Age: 29
End: 2024-04-12
Payer: COMMERCIAL

## 2024-05-03 ENCOUNTER — HOSPITAL ENCOUNTER (OUTPATIENT)
Dept: GENERAL RADIOLOGY | Facility: CLINIC | Age: 29
Discharge: HOME | End: 2024-05-03
Attending: PHYSICIAN ASSISTANT | Admitting: PHYSICIAN ASSISTANT
Payer: COMMERCIAL

## 2024-05-03 DIAGNOSIS — K21.9 GASTROESOPHAGEAL REFLUX DISEASE WITHOUT ESOPHAGITIS: ICD-10-CM

## 2024-05-03 DIAGNOSIS — K21.9 GERD (GASTROESOPHAGEAL REFLUX DISEASE): Primary | ICD-10-CM

## 2024-05-03 PROCEDURE — 250N000013 HC RX MED GY IP 250 OP 250 PS 637: Performed by: PHYSICIAN ASSISTANT

## 2024-05-03 PROCEDURE — 74240 X-RAY XM UPR GI TRC 1CNTRST: CPT

## 2024-05-03 RX ADMIN — ANTACID/ANTIFLATULENT 4 G: 380; 550; 10; 10 GRANULE, EFFERVESCENT ORAL at 13:10

## 2024-07-03 ENCOUNTER — OFFICE VISIT (OUTPATIENT)
Dept: FAMILY MEDICINE | Facility: CLINIC | Age: 29
End: 2024-07-03
Payer: COMMERCIAL

## 2024-07-03 VITALS
RESPIRATION RATE: 16 BRPM | DIASTOLIC BLOOD PRESSURE: 68 MMHG | TEMPERATURE: 98 F | SYSTOLIC BLOOD PRESSURE: 124 MMHG | HEART RATE: 54 BPM | OXYGEN SATURATION: 99 % | HEIGHT: 74 IN | BODY MASS INDEX: 36.7 KG/M2 | WEIGHT: 286 LBS

## 2024-07-03 DIAGNOSIS — R00.2 PALPITATIONS: ICD-10-CM

## 2024-07-03 DIAGNOSIS — J45.40 MODERATE PERSISTENT ASTHMA WITHOUT COMPLICATION: ICD-10-CM

## 2024-07-03 DIAGNOSIS — J34.2 NASAL SEPTAL DEVIATION: ICD-10-CM

## 2024-07-03 DIAGNOSIS — J34.3 NASAL TURBINATE HYPERTROPHY: ICD-10-CM

## 2024-07-03 DIAGNOSIS — Z01.818 PREOP GENERAL PHYSICAL EXAM: Primary | ICD-10-CM

## 2024-07-03 DIAGNOSIS — R09.81 CHRONIC NASAL CONGESTION: ICD-10-CM

## 2024-07-03 LAB
ANION GAP SERPL CALCULATED.3IONS-SCNC: 10 MMOL/L (ref 7–15)
BUN SERPL-MCNC: 16.8 MG/DL (ref 6–20)
CALCIUM SERPL-MCNC: 10 MG/DL (ref 8.6–10)
CHLORIDE SERPL-SCNC: 103 MMOL/L (ref 98–107)
CREAT SERPL-MCNC: 1.01 MG/DL (ref 0.67–1.17)
DEPRECATED HCO3 PLAS-SCNC: 26 MMOL/L (ref 22–29)
EGFRCR SERPLBLD CKD-EPI 2021: >90 ML/MIN/1.73M2
ERYTHROCYTE [DISTWIDTH] IN BLOOD BY AUTOMATED COUNT: 12.9 % (ref 10–15)
GLUCOSE SERPL-MCNC: 99 MG/DL (ref 70–99)
HCT VFR BLD AUTO: 44.8 % (ref 40–53)
HGB BLD-MCNC: 15 G/DL (ref 13.3–17.7)
MAGNESIUM SERPL-MCNC: 1.9 MG/DL (ref 1.7–2.3)
MCH RBC QN AUTO: 30.4 PG (ref 26.5–33)
MCHC RBC AUTO-ENTMCNC: 33.5 G/DL (ref 31.5–36.5)
MCV RBC AUTO: 91 FL (ref 78–100)
PLATELET # BLD AUTO: 210 10E3/UL (ref 150–450)
POTASSIUM SERPL-SCNC: 4.5 MMOL/L (ref 3.4–5.3)
RBC # BLD AUTO: 4.94 10E6/UL (ref 4.4–5.9)
SODIUM SERPL-SCNC: 139 MMOL/L (ref 135–145)
T4 FREE SERPL-MCNC: 1.35 NG/DL (ref 0.9–1.7)
TSH SERPL DL<=0.005 MIU/L-ACNC: 6.27 UIU/ML (ref 0.3–4.2)
WBC # BLD AUTO: 7.7 10E3/UL (ref 4–11)

## 2024-07-03 PROCEDURE — 80048 BASIC METABOLIC PNL TOTAL CA: CPT | Performed by: PHYSICIAN ASSISTANT

## 2024-07-03 PROCEDURE — 83735 ASSAY OF MAGNESIUM: CPT | Performed by: PHYSICIAN ASSISTANT

## 2024-07-03 PROCEDURE — 36415 COLL VENOUS BLD VENIPUNCTURE: CPT | Performed by: PHYSICIAN ASSISTANT

## 2024-07-03 PROCEDURE — 85027 COMPLETE CBC AUTOMATED: CPT | Performed by: PHYSICIAN ASSISTANT

## 2024-07-03 PROCEDURE — 93242 EXT ECG>48HR<7D RECORDING: CPT | Performed by: PHYSICIAN ASSISTANT

## 2024-07-03 PROCEDURE — 84439 ASSAY OF FREE THYROXINE: CPT | Performed by: PHYSICIAN ASSISTANT

## 2024-07-03 PROCEDURE — 93000 ELECTROCARDIOGRAM COMPLETE: CPT | Mod: 59 | Performed by: PHYSICIAN ASSISTANT

## 2024-07-03 PROCEDURE — 99214 OFFICE O/P EST MOD 30 MIN: CPT | Performed by: PHYSICIAN ASSISTANT

## 2024-07-03 PROCEDURE — 84443 ASSAY THYROID STIM HORMONE: CPT | Performed by: PHYSICIAN ASSISTANT

## 2024-07-03 ASSESSMENT — PATIENT HEALTH QUESTIONNAIRE - PHQ9
10. IF YOU CHECKED OFF ANY PROBLEMS, HOW DIFFICULT HAVE THESE PROBLEMS MADE IT FOR YOU TO DO YOUR WORK, TAKE CARE OF THINGS AT HOME, OR GET ALONG WITH OTHER PEOPLE: NOT DIFFICULT AT ALL
SUM OF ALL RESPONSES TO PHQ QUESTIONS 1-9: 6
SUM OF ALL RESPONSES TO PHQ QUESTIONS 1-9: 6

## 2024-07-03 ASSESSMENT — PAIN SCALES - GENERAL: PAINLEVEL: NO PAIN (0)

## 2024-07-03 NOTE — PROGRESS NOTES
Preoperative Evaluation  [facility]  [address] [place] [id number]  Phone: [phone]  Fax: [phone]  Primary Provider: Iván Delgado PA-C  Pre-op Performing Provider: Iván Delgado PA-C  Jul 3, 2024           7/3/2024   Surgical Information   What procedure is being done? Turbanit reduction septoplasty   Facility or Hospital where procedure/surgery will be performed: [facility]   Who is doing the procedure / surgery? Someone-Bob You MD    Date of surgery / procedure: 7-18-24   Time of surgery / procedure: Unknown   Where do you plan to recover after surgery? at home with family      Fax number for surgical facility: Note does not need to be faxed, will be available electronically in Epic.    Assessment & Plan   The proposed surgical procedure is considered LOW risk.    Problem List Items Addressed This Visit          Respiratory    Moderate persistent asthma    Nasal turbinate hypertrophy    Nasal septal deviation     Other Visit Diagnoses       Preop general physical exam    -  Primary    Relevant Orders    CBC with platelets    Basic metabolic panel  (Ca, Cl, CO2, Creat, Gluc, K, Na, BUN)    Chronic nasal congestion        Palpitations        Relevant Orders    EKG 12-lead complete w/read - Clinics (Completed)    ZIO PATCH 3-7 DAYS (additional cost to patient) (Completed)    ZIO PATCH 3-7 DAYS APPLICATION (Completed)    TSH with free T4 reflex    Magnesium           - No identified additional risk factors other than previously addressed    Recurrent palpitations: Vitals and exam today are unremarkable.  An EKG was obtained and compared to 2010 there is not much change but it does appear that he might have a right bundle branch block.  We will set him up with a 3-day Zio patch here from the clinic which should be resulted in time prior to his surgery.  I do not think we need to delay his surgery at this point but he does need to  complete this evaluation prior to moving forward.    Addendum (7/12/2024): 3 day Zio-patch is unremarkable for significant arrhythmias. Symptoms correlated with infrequent isolated PACs.     Recommendation  Approval given to proceed with proposed procedure, without further diagnostic evaluation.     Susanna Minor is a 28 year old, presenting for the following:  Pre-Op Exam        7/3/2024     7:40 AM   Additional Questions   Roomed by Janet CARDOZA CMA   Accompanied by Self     HPI related to upcoming procedure: Patient is a 28-year-old male with history of obesity, depression and anxiety who presents for preoperative evaluation for a proposed septoplasty with turbinoplasty.  Patient is currently ill with a mild upper respiratory infection.  He also notes intermittent frequent palpitations that mostly randomly but do occur with exertion.  He has been evaluated once before in ER approximately 1 and half years ago and was shown to have some PVCs on his EKG.  He has also been treating his heartburn as a potential cause of these palpitations but despite resolution of his heartburn his palpitations persisted.  He denies any other symptoms including chest pain, nausea or vomiting, diarrhea constipation, abdominal pain, rashes, headache or any other concerning systemic sign or symptom.        7/3/2024   Pre-Op Questionnaire   Have you ever had a heart attack or stroke? No   Have you ever had surgery on your heart or blood vessels, such as a stent placement, a coronary artery bypass, or surgery on an artery in your head, neck, heart, or legs? No   Do you have chest pain with activity? No   Do you have a history of heart failure? No   Do you currently have a cold, bronchitis or symptoms of other infection? No   Do you have a cough, shortness of breath, or wheezing? No   Do you or anyone in your family have previous history of blood clots? (!) UNKNOWN    Do you or does anyone in your family have a serious bleeding problem  such as prolonged bleeding following surgeries or cuts? (!) UNKNOWN    Have you ever had problems with anemia or been told to take iron pills? No   Have you had any abnormal blood loss such as black, tarry or bloody stools? No   Have you ever had a blood transfusion? No   Are you willing to have a blood transfusion if it is medically needed before, during, or after your surgery? Yes   Have you or any of your relatives ever had problems with anesthesia? (!) UNKNOWN    Do you have sleep apnea, excessive snoring or daytime drowsiness? No   Do you have any artifical heart valves or other implanted medical devices like a pacemaker, defibrillator, or continuous glucose monitor? No   Do you have artificial joints? No   Are you allergic to latex? No      Preoperative Review of    reviewed - no record of controlled substances prescribed.    Status of Chronic Conditions:  ASTHMA - Patient has a longstanding history of moderate-severe Asthma . Patient has been doing well overall noting NO SYMPTOMS.    Patient Active Problem List    Diagnosis Date Noted    Nasal turbinate hypertrophy 02/22/2024     Priority: Medium    Nasal septal deviation 02/22/2024     Priority: Medium    Morbid obesity (H) 11/02/2021     Priority: Medium    Moderate persistent asthma 03/13/2012     Priority: Medium    GERD (gastroesophageal reflux disease) 11/16/2011     Priority: Medium    Mild major depression (H) 09/13/2011     Priority: Medium    Chronic constipation 03/02/2009     Priority: Medium    Attention deficit hyperactivity disorder (ADHD) 01/31/2008     Priority: Medium     DXD age 7, Dr. Lewis initially evaluated here, now back with Dr. Pinto in [facility]  Problem list name updated by automated process. Provider to review      Generalized anxiety disorder 01/31/2008     Priority: Medium     Diagnosis updated by automated process. Provider to review and confirm.        Past Medical History:    Diagnosis Date    ANXIETY STATE NOS 1/31/2008    ATTN DEFICIT W HYPERACT 1/31/2008    DXD age 7, Dr. Lewis initially evaluated here, now back with [facility] Pinto in [facility]    DEPRESSIVE DISORDER NEC 1/31/2008     Past Surgical History:   Procedure Laterality Date    SURGICAL HISTORY OF -   less than 2-1/2 years old    thumb injury      Current Outpatient Medications   Medication Sig Dispense Refill    escitalopram (LEXAPRO) 20 MG tablet Take 1 tablet (20 mg) by mouth daily -due for recheck in clinic 90 tablet 3    TYLENOL OR       azelastine (ASTELIN) 0.1 % nasal spray Spray 1 spray into both nostrils 2 times daily (Patient not taking: Reported on 11/16/2023) 30 mL 1    fluticasone (FLONASE) 50 MCG/ACT nasal spray Spray 1 spray into both nostrils daily (Patient not taking: Reported on 11/16/2023) 16 g 1    omeprazole (PRILOSEC) 20 MG DR capsule Take 1 capsule (20 mg) by mouth daily Take in AM 30-60 minutes before eating. (Patient not taking: Reported on 11/16/2023) 30 capsule 1       Allergies   Allergen Reactions    No Known Drug Allergy         Social History     Tobacco Use    Smoking status: Never    Smokeless tobacco: Never   Substance Use Topics    Alcohol use: No     History   Drug Use No             Review of Systems  CONSTITUTIONAL: NEGATIVE for fever, chills, change in weight  INTEGUMENTARY/SKIN: NEGATIVE for worrisome rashes, moles or lesions  EYES: NEGATIVE for vision changes or irritation  ENT/MOUTH: Positive for nasal congestion, rhinorrhea-clear, and sore throat  RESP: NEGATIVE for significant cough or SOB  BREAST: NEGATIVE for masses, tenderness or discharge  CV: NEGATIVE for chest pain, palpitations or peripheral edema  GI: NEGATIVE for nausea, abdominal pain, heartburn, or change in bowel habits  : NEGATIVE for frequency, dysuria, or hematuria  MUSCULOSKELETAL: NEGATIVE for significant arthralgias or myalgia  NEURO: NEGATIVE for weakness, dizziness or  "paresthesias  ENDOCRINE: NEGATIVE for temperature intolerance, skin/hair changes  HEME: NEGATIVE for bleeding problems  PSYCHIATRIC: NEGATIVE for changes in mood or affect    Objective    /68 (BP Location: Right arm, Patient Position: Sitting, Cuff Size: Adult Large)   Pulse 54   Temp 98  F (36.7  C) (Tympanic)   Resp 16   Ht 1.88 m (6' 2.02\")   Wt 129.7 kg (286 lb)   SpO2 99%   BMI 36.70 kg/m     Estimated body mass index is 36.7 kg/m  as calculated from the following:    Height as of this encounter: 1.88 m (6' 2.02\").    Weight as of this encounter: 129.7 kg (286 lb).  Constitutional: healthy, alert, and no distress  Head: Normocephalic. Atraumatic  Eyes: No conjunctival injection, sclera anicteric  ENT: Bilateral TM's and canal wnl. MMM. Throat is without erythema, tonsillar enlargement or exudates. No uvular deviation. Airway patent.   Neck: No anterior or posterior cervical lymphadenopathy, supple, no thyromegaly  Cardiovascular: RRR. No murmurs, clicks, gallops, or rubs. No peripheral edema.   Respiratory: No resp distress. Lungs CTAB bilaterally.  Abdomen: soft, non-tender throughout, no rebound or guarding. NABS x4.    Musculoskeletal: extremities normal- no gross deformities noted, and normal muscle tone  Skin: no suspicious lesions or rashes  Neurologic: Gait normal. CN 2-12 grossly intact. Sensation, strength and coordination are grossly intact.   Psychiatric: mentation appears normal and affect normal/bright     EKG: Normal sinus rhythm without ST or T wave changes consistent with ischemia.  Likely right bundle branch block.  Largely unchanged from prior from 2010.    "

## 2024-07-03 NOTE — PATIENT INSTRUCTIONS
Patient Education   Preparing for Your Surgery  Getting started  A nurse will call you to review your health history and instructions. They will give you an arrival time based on your scheduled surgery time. Please be ready to share:  Your doctor's clinic name and phone number  Your medical, surgical, and anesthesia history  A list of allergies and sensitivities  A list of medicines, including herbal treatments and over-the-counter drugs  Whether the patient has a legal guardian (ask how to send us the papers in advance)  Please tell us if you're pregnant--or if there's any chance you might be pregnant. Some surgeries may injure a fetus (unborn baby), so they require a pregnancy test. Surgeries that are safe for a fetus don't always need a test, and you can choose whether to have one.   If you have a child who's having surgery, please ask for a copy of Preparing for Your Child's Surgery.    Preparing for surgery  Within 10 to 30 days of surgery: Have a pre-op exam (sometimes called an H&P, or History and Physical). This can be done at a clinic or pre-operative center.  If you're having a , you may not need this exam. Talk to your care team.  At your pre-op exam, talk to your care team about all medicines you take. If you need to stop any medicines before surgery, ask when to start taking them again.  We do this for your safety. Many medicines can make you bleed too much during surgery. Some change how well surgery (anesthesia) drugs work.  Call your insurance company to let them know you're having surgery. (If you don't have insurance, call [phone].)  Call your clinic if there's any change in your health. This includes signs of a cold or flu (sore throat, runny nose, cough, rash, fever). It also includes a scrape or scratch near the surgery site.  If you have questions on the day of surgery, call your hospital or surgery center.  Eating and drinking guidelines  For your safety: Unless your surgeon  tells you otherwise, follow the guidelines below.  Eat and drink as usual until 8 hours before you arrive for surgery. After that, no food or milk.  Drink clear liquids until 2 hours before you arrive. These are liquids you can see through, like water, Gatorade, and Propel Water. They also include plain black coffee and tea (no cream or milk), candy, and breath mints. You can spit out gum when you arrive.  If you drink alcohol: Stop drinking it the night before surgery.  If your care team tells you to take medicine on the morning of surgery, it's okay to take it with a sip of water.  Preventing infection  Shower or bathe the night before and morning of your surgery. Follow the instructions your clinic gave you. (If no instructions, use regular soap.)  Don't shave or clip hair near your surgery site. We'll remove the hair if needed.  Don't smoke or vape the morning of surgery. You may chew nicotine gum up to 2 hours before surgery. A nicotine patch is okay.  Note: Some surgeries require you to completely quit smoking and nicotine. Check with your surgeon.  Your care team will make every effort to keep you safe from infection. We will:  Clean our hands often with soap and water (or an alcohol-based hand rub).  Clean the skin at your surgery site with a special soap that kills germs.  Give you a special gown to keep you warm. (Cold raises the risk of infection.)  Wear special hair covers, masks, gowns and gloves during surgery.  Give antibiotic medicine, if prescribed. Not all surgeries need antibiotics.  What to bring on the day of surgery  Photo ID and insurance card  Copy of your health care directive, if you have one  Glasses and hearing aids (bring cases)  You can't wear contacts during surgery  Inhaler and eye drops, if you use them (tell us about these when you arrive)  CPAP machine or breathing device, if you use them  A few personal items, if spending the night  If you have . . .  A pacemaker, ICD (cardiac  defibrillator) or other implant: Bring the ID card.  An implanted stimulator: Bring the remote control.  A legal guardian: Bring a copy of the certified (court-stamped) guardianship papers.  Please remove any jewelry, including body piercings. Leave jewelry and other valuables at home.  If you're going home the day of surgery  You must have a responsible adult drive you home. They should stay with you overnight as well.  If you don't have someone to stay with you, and you aren't safe to go home alone, we may keep you overnight. Insurance often won't pay for this.  After surgery  If it's hard to control your pain or you need more pain medicine, please call your surgeon's office.  Questions?   If you have any questions for your care team, list them here: _________________________________________________________________________________________________________________________________________________________________________ ____________________________________ ____________________________________ ____________________________________  For informational purposes only. Not to replace the advice of your health care provider. Copyright   2003, 2019 [facility]. All rights reserved. Clinically reviewed by Negar Rma MD. [employer] [id number] - REV 12/22.

## 2024-07-03 NOTE — NURSING NOTE
Iván Burton arrived here on 7/3/2024 11:18 AM for 3-7 Days  Zio monitor placement per ordering provider Iván Delgado for the diagnosis Palpitations.  Patient s skin was prepped per protocol. Iván Delgado is the supervising PA-C.  Zio monitor was placed.  Instructions were reviewed with and given to the patient.  Patient verbalized understanding of wear, troubleshooting and monitor return instructions.

## 2024-07-12 PROCEDURE — 93244 EXT ECG>48HR<7D REV&INTERPJ: CPT | Performed by: INTERNAL MEDICINE

## 2024-07-17 ENCOUNTER — ANESTHESIA EVENT (OUTPATIENT)
Dept: SURGERY | Facility: AMBULATORY SURGERY CENTER | Age: 29
End: 2024-07-17
Payer: COMMERCIAL

## 2024-07-18 ENCOUNTER — HOSPITAL ENCOUNTER (OUTPATIENT)
Facility: AMBULATORY SURGERY CENTER | Age: 29
Discharge: HOME | End: 2024-07-18
Attending: OTOLARYNGOLOGY | Admitting: OTOLARYNGOLOGY
Payer: COMMERCIAL

## 2024-07-18 ENCOUNTER — ANESTHESIA (OUTPATIENT)
Dept: SURGERY | Facility: AMBULATORY SURGERY CENTER | Age: 29
End: 2024-07-18
Payer: COMMERCIAL

## 2024-07-18 VITALS
OXYGEN SATURATION: 96 % | SYSTOLIC BLOOD PRESSURE: 173 MMHG | RESPIRATION RATE: 16 BRPM | HEART RATE: 78 BPM | DIASTOLIC BLOOD PRESSURE: 79 MMHG | TEMPERATURE: 98.3 F

## 2024-07-18 DIAGNOSIS — Z98.890 S/P NASAL SEPTOPLASTY: Primary | ICD-10-CM

## 2024-07-18 PROCEDURE — 30520 REPAIR OF NASAL SEPTUM: CPT | Performed by: NURSE ANESTHETIST, CERTIFIED REGISTERED

## 2024-07-18 PROCEDURE — 30140 RESECT INFERIOR TURBINATE: CPT | Mod: 50

## 2024-07-18 PROCEDURE — 31256 EXPLORATION MAXILLARY SINUS: CPT | Mod: 50

## 2024-07-18 PROCEDURE — 88304 TISSUE EXAM BY PATHOLOGIST: CPT | Performed by: STUDENT IN AN ORGANIZED HEALTH CARE EDUCATION/TRAINING PROGRAM

## 2024-07-18 PROCEDURE — G8907 PT DOC NO EVENTS ON DISCHARG: HCPCS

## 2024-07-18 PROCEDURE — G8918 PT W/O PREOP ORDER IV AB PRO: HCPCS

## 2024-07-18 PROCEDURE — 30520 REPAIR OF NASAL SEPTUM: CPT | Performed by: ANESTHESIOLOGY

## 2024-07-18 PROCEDURE — 30801 ABLATE INF TURBINATE SUPERF: CPT | Mod: 59 | Performed by: OTOLARYNGOLOGY

## 2024-07-18 PROCEDURE — 30520 REPAIR OF NASAL SEPTUM: CPT

## 2024-07-18 PROCEDURE — 31254 NSL/SINS NDSC W/PRTL ETHMDCT: CPT | Mod: 50 | Performed by: OTOLARYNGOLOGY

## 2024-07-18 PROCEDURE — 31256 EXPLORATION MAXILLARY SINUS: CPT | Mod: 50 | Performed by: OTOLARYNGOLOGY

## 2024-07-18 PROCEDURE — 30520 REPAIR OF NASAL SEPTUM: CPT | Performed by: OTOLARYNGOLOGY

## 2024-07-18 PROCEDURE — 88311 DECALCIFY TISSUE: CPT | Performed by: STUDENT IN AN ORGANIZED HEALTH CARE EDUCATION/TRAINING PROGRAM

## 2024-07-18 RX ORDER — OXYMETAZOLINE HYDROCHLORIDE 0.05 G/100ML
2 SPRAY NASAL 2 TIMES DAILY
Qty: 22 ML | Refills: 0 | Status: SHIPPED | OUTPATIENT
Start: 2024-07-18

## 2024-07-18 RX ORDER — OXYCODONE HYDROCHLORIDE 5 MG/1
5 TABLET ORAL
Status: DISCONTINUED | OUTPATIENT
Start: 2024-07-18 | End: 2024-07-19 | Stop reason: HOSPADM

## 2024-07-18 RX ORDER — OXYCODONE HYDROCHLORIDE 5 MG/1
10 TABLET ORAL
Status: DISCONTINUED | OUTPATIENT
Start: 2024-07-18 | End: 2024-07-19 | Stop reason: HOSPADM

## 2024-07-18 RX ORDER — ACETAMINOPHEN 325 MG/1
975 TABLET ORAL ONCE
Status: COMPLETED | OUTPATIENT
Start: 2024-07-18 | End: 2024-07-18

## 2024-07-18 RX ORDER — NALOXONE HYDROCHLORIDE 0.4 MG/ML
0.1 INJECTION, SOLUTION INTRAMUSCULAR; INTRAVENOUS; SUBCUTANEOUS
Status: DISCONTINUED | OUTPATIENT
Start: 2024-07-18 | End: 2024-07-19 | Stop reason: HOSPADM

## 2024-07-18 RX ORDER — ONDANSETRON 4 MG/1
4 TABLET, ORALLY DISINTEGRATING ORAL EVERY 30 MIN PRN
Status: DISCONTINUED | OUTPATIENT
Start: 2024-07-18 | End: 2024-07-19 | Stop reason: HOSPADM

## 2024-07-18 RX ORDER — LIDOCAINE 40 MG/G
CREAM TOPICAL
Status: DISCONTINUED | OUTPATIENT
Start: 2024-07-18 | End: 2024-07-19 | Stop reason: HOSPADM

## 2024-07-18 RX ORDER — PROPOFOL 10 MG/ML
INJECTION, EMULSION INTRAVENOUS CONTINUOUS PRN
Status: DISCONTINUED | OUTPATIENT
Start: 2024-07-18 | End: 2024-07-18

## 2024-07-18 RX ORDER — LIDOCAINE HYDROCHLORIDE AND EPINEPHRINE 10; 10 MG/ML; UG/ML
INJECTION, SOLUTION INFILTRATION; PERINEURAL PRN
Status: DISCONTINUED | OUTPATIENT
Start: 2024-07-18 | End: 2024-07-18 | Stop reason: HOSPADM

## 2024-07-18 RX ORDER — BACITRACIN ZINC 500 [USP'U]/G
OINTMENT TOPICAL PRN
Status: DISCONTINUED | OUTPATIENT
Start: 2024-07-18 | End: 2024-07-18 | Stop reason: HOSPADM

## 2024-07-18 RX ORDER — PROPOFOL 10 MG/ML
INJECTION, EMULSION INTRAVENOUS PRN
Status: DISCONTINUED | OUTPATIENT
Start: 2024-07-18 | End: 2024-07-18

## 2024-07-18 RX ORDER — OXYCODONE HYDROCHLORIDE 5 MG/1
5 TABLET ORAL EVERY 6 HOURS PRN
Qty: 12 TABLET | Refills: 0 | Status: SHIPPED | OUTPATIENT
Start: 2024-07-18 | End: 2024-07-21

## 2024-07-18 RX ORDER — ONDANSETRON 2 MG/ML
INJECTION INTRAMUSCULAR; INTRAVENOUS PRN
Status: DISCONTINUED | OUTPATIENT
Start: 2024-07-18 | End: 2024-07-18

## 2024-07-18 RX ORDER — ONDANSETRON 2 MG/ML
4 INJECTION INTRAMUSCULAR; INTRAVENOUS EVERY 30 MIN PRN
Status: DISCONTINUED | OUTPATIENT
Start: 2024-07-18 | End: 2024-07-19 | Stop reason: HOSPADM

## 2024-07-18 RX ORDER — LIDOCAINE HYDROCHLORIDE 20 MG/ML
INJECTION, SOLUTION INFILTRATION; PERINEURAL PRN
Status: DISCONTINUED | OUTPATIENT
Start: 2024-07-18 | End: 2024-07-18

## 2024-07-18 RX ORDER — DEXAMETHASONE SODIUM PHOSPHATE 4 MG/ML
4 INJECTION, SOLUTION INTRA-ARTICULAR; INTRALESIONAL; INTRAMUSCULAR; INTRAVENOUS; SOFT TISSUE
Status: DISCONTINUED | OUTPATIENT
Start: 2024-07-18 | End: 2024-07-19 | Stop reason: HOSPADM

## 2024-07-18 RX ORDER — OXYMETAZOLINE HYDROCHLORIDE 0.05 G/100ML
SPRAY NASAL PRN
Status: DISCONTINUED | OUTPATIENT
Start: 2024-07-18 | End: 2024-07-18 | Stop reason: HOSPADM

## 2024-07-18 RX ORDER — OXYMETAZOLINE HYDROCHLORIDE 0.05 G/100ML
2 SPRAY NASAL
Status: ACTIVE | OUTPATIENT
Start: 2024-07-18 | End: 2024-07-18

## 2024-07-18 RX ORDER — SODIUM CHLORIDE, SODIUM LACTATE, POTASSIUM CHLORIDE, CALCIUM CHLORIDE 600; 310; 30; 20 MG/100ML; MG/100ML; MG/100ML; MG/100ML
INJECTION, SOLUTION INTRAVENOUS CONTINUOUS
Status: DISCONTINUED | OUTPATIENT
Start: 2024-07-18 | End: 2024-07-19 | Stop reason: HOSPADM

## 2024-07-18 RX ORDER — DEXAMETHASONE SODIUM PHOSPHATE 4 MG/ML
INJECTION, SOLUTION INTRA-ARTICULAR; INTRALESIONAL; INTRAMUSCULAR; INTRAVENOUS; SOFT TISSUE PRN
Status: DISCONTINUED | OUTPATIENT
Start: 2024-07-18 | End: 2024-07-18

## 2024-07-18 RX ADMIN — Medication 0.5 MG: at 13:06

## 2024-07-18 RX ADMIN — DEXAMETHASONE SODIUM PHOSPHATE 10 MG: 4 INJECTION, SOLUTION INTRA-ARTICULAR; INTRALESIONAL; INTRAMUSCULAR; INTRAVENOUS; SOFT TISSUE at 13:12

## 2024-07-18 RX ADMIN — ACETAMINOPHEN 975 MG: 325 TABLET ORAL at 11:53

## 2024-07-18 RX ADMIN — OXYMETAZOLINE HYDROCHLORIDE 2 SPRAY: 0.05 SPRAY NASAL at 11:52

## 2024-07-18 RX ADMIN — Medication 0.5 MG: at 14:11

## 2024-07-18 RX ADMIN — Medication 50 MG: at 13:06

## 2024-07-18 RX ADMIN — PROPOFOL 100 MCG/KG/MIN: 10 INJECTION, EMULSION INTRAVENOUS at 13:11

## 2024-07-18 RX ADMIN — SODIUM CHLORIDE, SODIUM LACTATE, POTASSIUM CHLORIDE, CALCIUM CHLORIDE: 600; 310; 30; 20 INJECTION, SOLUTION INTRAVENOUS at 11:53

## 2024-07-18 RX ADMIN — PROPOFOL 200 MG: 10 INJECTION, EMULSION INTRAVENOUS at 13:06

## 2024-07-18 RX ADMIN — OXYMETAZOLINE HYDROCHLORIDE 2 SPRAY: 0.05 SPRAY NASAL at 11:41

## 2024-07-18 RX ADMIN — ONDANSETRON 4 MG: 2 INJECTION INTRAMUSCULAR; INTRAVENOUS at 13:12

## 2024-07-18 RX ADMIN — SODIUM CHLORIDE, SODIUM LACTATE, POTASSIUM CHLORIDE, CALCIUM CHLORIDE: 600; 310; 30; 20 INJECTION, SOLUTION INTRAVENOUS at 13:45

## 2024-07-18 RX ADMIN — LIDOCAINE HYDROCHLORIDE 60 MG: 20 INJECTION, SOLUTION INFILTRATION; PERINEURAL at 13:06

## 2024-07-18 NOTE — ANESTHESIA CARE TRANSFER NOTE
Patient: Iván Burton    Procedure: Procedure(s):  SEPTOPLASTY, NOSE, WITH TURBINOPLASTY  Bilateral Maxillary Antrostomy       Diagnosis: Nasal turbinate hypertrophy [J34.3]  Nasal septal deviation [J34.2]  Diagnosis Additional Information: No value filed.    Anesthesia Type:   General     Note:    Oropharynx: oropharynx clear of all foreign objects and spontaneously breathing  Level of Consciousness: drowsy  Oxygen Supplementation: face mask  Level of Supplemental Oxygen (L/min / FiO2): 5  Independent Airway: airway patency satisfactory and stable  Dentition: dentition unchanged  Vital Signs Stable: post-procedure vital signs reviewed and stable  Report to RN Given: handoff report given  Patient transferred to: PACU    Handoff Report: Identifed the Patient, Identified the Reponsible Provider, Reviewed the pertinent medical history, Discussed the surgical course, Reviewed Intra-OP anesthesia mangement and issues during anesthesia, Set expectations for post-procedure period and Allowed opportunity for questions and acknowledgement of understanding  Vitals:  Vitals Value Taken Time   BP     Temp     Pulse     Resp     SpO2         Electronically Signed By: JAIME Chamberlain CRNA  July 18, 2024  4:18 PM

## 2024-07-18 NOTE — ANESTHESIA PREPROCEDURE EVALUATION
Anesthesia Pre-Procedure Evaluation    Patient: Iván Burton   MRN: [id number] : 1995        Procedure : Procedure(s):  SEPTOPLASTY, NOSE, WITH TURBINOPLASTY  Bilateral Maxillary Antrostomy          Past Medical History:   Diagnosis Date    ANXIETY STATE NOS 2008    ATTN DEFICIT W HYPERACT 2008    DXD age 7, Dr. Lewis initially evaluated here, now back with Dr. Pinto in [facility]    DEPRESSIVE DISORDER NEC 2008      Past Surgical History:   Procedure Laterality Date    SURGICAL HISTORY OF -   less than 2-1/2 years old    thumb injury       Allergies   Allergen Reactions    No Known Drug Allergy       Social History     Tobacco Use    Smoking status: Never    Smokeless tobacco: Never   Substance Use Topics    Alcohol use: No      Wt Readings from Last 1 Encounters:   24 129.7 kg (286 lb)        Anesthesia Evaluation            ROS/MED HX  ENT/Pulmonary:     (+)                     Moderate Persistent, asthma                  Neurologic:  - neg neurologic ROS     Cardiovascular:  - neg cardiovascular ROS     METS/Exercise Tolerance:     Hematologic:  - neg hematologic  ROS     Musculoskeletal:  - neg musculoskeletal ROS     GI/Hepatic:  - neg GI/hepatic ROS     Renal/Genitourinary:  - neg Renal ROS     Endo:     (+)               Obesity,       Psychiatric/Substance Use: Comment: ADHD    (+) psychiatric history anxiety and depression       Infectious Disease:  - neg infectious disease ROS     Malignancy:       Other:            Physical Exam    Airway  airway exam normal      Mallampati: II       Respiratory Devices and Support         Dental       (+) Minor Abnormalities - some fillings, tiny chips      Cardiovascular   cardiovascular exam normal          Pulmonary   pulmonary exam normal                OUTSIDE LABS:  CBC:   Lab Results   Component Value Date    WBC 7.7 2024    WBC 7.8 2021    HGB 15.0 2024    HGB 15.9  "11/02/2021    HCT 44.8 07/03/2024    HCT 44.6 11/02/2021     07/03/2024     11/02/2021     BMP:   Lab Results   Component Value Date     07/03/2024     11/02/2021    POTASSIUM 4.5 07/03/2024    POTASSIUM 3.9 11/02/2021    CHLORIDE 103 07/03/2024    CHLORIDE 108 11/02/2021    CO2 26 07/03/2024    CO2 25 11/02/2021    BUN 16.8 07/03/2024    BUN 18 11/02/2021    CR 1.01 07/03/2024    CR 0.90 11/02/2021    GLC 99 07/03/2024     (H) 11/02/2021     COAGS: No results found for: \"PTT\", \"INR\", \"FIBR\"  POC: No results found for: \"BGM\", \"HCG\", \"HCGS\"  HEPATIC:   Lab Results   Component Value Date    ALBUMIN 4.0 11/02/2021    PROTTOTAL 7.7 11/02/2021    ALT 50 11/02/2021    AST 20 11/02/2021    ALKPHOS 92 11/02/2021    BILITOTAL 0.5 11/02/2021     OTHER:   Lab Results   Component Value Date    A1C 5.0 04/05/2024    SHELLY 10.0 07/03/2024    MAG 1.9 07/03/2024    LIPASE 31 02/23/2012    TSH 6.27 (H) 07/03/2024    T4 1.35 07/03/2024    SED 3 06/02/2014       Anesthesia Plan    ASA Status:  3    NPO Status:  NPO Appropriate    Anesthesia Type: General.     - Airway: ETT   Induction: Intravenous, Propofol.   Maintenance: Balanced.        Consents    Anesthesia Plan(s) and associated risks, benefits, and realistic alternatives discussed. Questions answered and patient/representative(s) expressed understanding.     - Discussed: Risks, Benefits and Alternatives for the PROCEDURE were discussed     - Discussed with:  Patient            Postoperative Care    Pain management: Oral pain medications, IV analgesics, Multi-modal analgesia.   PONV prophylaxis: Ondansetron (or other 5HT-3), Dexamethasone or Solumedrol, Background Propofol Infusion     Comments:               Uli Long MD    I have reviewed the pertinent notes and labs in the chart from the past 30 days and (re)examined the patient.  Any updates or changes from those notes are reflected in this note.              # Obesity: Estimated body " "mass index is 36.7 kg/m  as calculated from the following:    Height as of 7/3/24: 1.88 m (6' 2.02\").    Weight as of 7/3/24: 129.7 kg (286 lb).      "

## 2024-07-18 NOTE — ANESTHESIA PROCEDURE NOTES
Airway       Patient location during procedure: OR       Procedure Start/Stop Times: 7/18/2024 1:09 PM  Staff -        Performed By: CRNAIndications and Patient Condition       Indications for airway management: jose-procedural       Induction type:intravenous       Mask difficulty assessment: 1 - vent by mask    Final Airway Details       Final airway type: endotracheal airway       Successful airway: NORRIS and Oral  Endotracheal Airway Details        ETT size (mm): 8.0       Cuffed: yes       Successful intubation technique: direct laryngoscopy       DL Blade Type: Mack 2       Grade View of Cords: 1       Adjucts: stylet       Position: Center    Post intubation assessment        Placement verified by: capnometry, equal breath sounds and chest rise        Number of attempts at approach: 1       Number of other approaches attempted: 0       Ease of procedure: easy       Dentition: Intact       Dental guard used and removed.    Medication(s) Administered   Medication Administration Time: 7/18/2024 1:09 PM

## 2024-07-18 NOTE — ANESTHESIA POSTPROCEDURE EVALUATION
Patient: Iván Burton    Procedure: Procedure(s):  SEPTOPLASTY, NOSE, WITH TURBINOPLASTY  Bilateral Maxillary Antrostomy       Anesthesia Type:  General    Note:  Disposition: Outpatient   Postop Pain Control: Uneventful            Sign Out: Well controlled pain   PONV: No   Neuro/Psych: Uneventful            Sign Out: Acceptable/Baseline neuro status   Airway/Respiratory: Uneventful            Sign Out: Acceptable/Baseline resp. status   CV/Hemodynamics: Uneventful            Sign Out: Acceptable CV status; No obvious hypovolemia; No obvious fluid overload   Other NRE: NONE   DID A NON-ROUTINE EVENT OCCUR?            Last vitals:  Vitals Value Taken Time   /95 07/18/24 1635   Temp 97.8  F (36.6  C) 07/18/24 1618   Pulse 78 07/18/24 1635   Resp 16 07/18/24 1635   SpO2 96 % 07/18/24 1635       Electronically Signed By: Uli Long MD  July 18, 2024  4:56 PM

## 2024-07-18 NOTE — OP NOTE
Name:   MR:  :   DOS:       Surgeon: Bob You MD    Preoperative diagnosis: 1. Septal deviation        2. Inferior Turbinate hypertrophy        3. Bilateral maxillary recurrent sinusitis      Postoperative diagnosis: same  Procedures performed:     1. Septoplasty     2. Inferior turbinoplasty; Bilaterally     3. Meato-antral window bilaterally       Anesthesia: general Endotracheal anesthesia  EBL: 20cc.  Complications: None    Procedure in detail. The patient was identified in the preoperative area and after obtaining informed consent, he was transferred to the operating theater and placed on the operating table in supine position. He was then endotracheally intubated by anesthesia without any complications. The bed was turned. He was then prepped and draped appropriately, 1% Lidocaine with 1: 100.000 epinephrine was injected to the anterior columella, caudal septum and along the septal floor bilaterally. After allowing adequate time for vasoconstriction, a sterling-transfixion incision was performed on the left side. The incision was taken down to the mucoperichondrial layer. Then a mucoperichondrial flap was elevated. There was a cartilaginous and bony spur on the left nasal septum. An incision was made through the septal cartilage approximately 2 cm posterior to the caudal septum. Using caudal elevator, a mucoperichondrial flap was then elevated on the right and deviated cartilage and bony parts of the septum were removed by forceps. Next the septal floor was addressed. With the use of osteotome, these deflections were removed along the maxillary crest. After removal of bony and cartilaginous deflections, there was a good nasal airway noted bilaterally. A plain 4-0 gut suture was used to re-approximate the flap to prevent any hematoma formation. Then the sterling-transfixion incision was closed with chromic sutures. Then the inferior turbinates were fractured bilaterally using freer elevator. After  fracturing the turbinates, the coblator cautery on a setting 6 for ablation and 3 for coagulation was used to make multiple passes in the left later right turbinate to reduce the size of the structure. After cauterization, there was significant decrease in the size of the turbinates.  A [place] elevator was used to out-fracture the inferior turbinates. The nasal cavity was suctioned.   Then attention was directed to the sinuses at first on the left side. The uncinate process was injected with 1% Lidocaine plus 1:100.000 epinephrine approximately 1 cc. The middle turbinate was then medialized using a freer. The uncinate process was dissected with a sickle knife and removed with a straight [place] forceps. Maxillary sinus ostium was observed and enlarged with backbiter. Fronto-ethmoidal recess was opened, and the content was sent to pathology. There was some thickened mucosa in the sinus. Then attention was directed to the right side. Here also uncinectomy was performed and maxillary ostium was widened, and anterior ethmoid was opened and content was sent to pathology separately. The maxillary sinuses appeared to be normal with no pus or polyp. There was no active bleeding. Then splints were inserted after hemitransfixion incison was closed with 4.0 chromic sutures. The splints were secured to the columella using a 3.0 nylon suture. The patient was turned back to anesthesia and extubated in the operating room without any complications. He was transferred to the post anesthesia care unit in stable condition.

## 2024-07-22 LAB
PATH REPORT.COMMENTS IMP SPEC: NORMAL
PATH REPORT.COMMENTS IMP SPEC: NORMAL
PATH REPORT.FINAL DX SPEC: NORMAL
PATH REPORT.GROSS SPEC: NORMAL
PATH REPORT.MICROSCOPIC SPEC OTHER STN: NORMAL
PATH REPORT.RELEVANT HX SPEC: NORMAL
PHOTO IMAGE: NORMAL

## 2024-07-26 ENCOUNTER — OFFICE VISIT (OUTPATIENT)
Dept: OTOLARYNGOLOGY | Facility: CLINIC | Age: 29
End: 2024-07-26
Payer: COMMERCIAL

## 2024-07-26 DIAGNOSIS — Z98.890 S/P NASAL SEPTOPLASTY: Primary | ICD-10-CM

## 2024-07-26 PROCEDURE — 99024 POSTOP FOLLOW-UP VISIT: CPT | Performed by: OTOLARYNGOLOGY

## 2024-07-26 ASSESSMENT — ENCOUNTER SYMPTOMS
CONSTITUTIONAL NEGATIVE: 1
PSYCHIATRIC NEGATIVE: 1
NEUROLOGICAL NEGATIVE: 1
RESPIRATORY NEGATIVE: 1
EYES NEGATIVE: 1
GASTROINTESTINAL NEGATIVE: 1

## 2024-07-26 NOTE — NURSING NOTE
Iván Burton's chief complaint for this visit includes:  Chief Complaint   Patient presents with    Surgical Followup     1 week S/P septoplasty, nose, with turbinoplasty; bilateral maxillary antrostomy DOS: 7/18/24. Doing well, no concerns today.     PCP: Iván Delgado    Referring Provider:  Referred Self, MD  No address on file    There were no vitals taken for this visit.      Ole Godwin, EMT  July 26, 2024

## 2024-07-26 NOTE — LETTER
7/26/2024      Iván Burton  [address] [facility] [address]      Dear Colleague,    Thank you for referring your patient, Iván Burton, to the [facility]. Please see a copy of my visit note below.    Chief Complaint   Patient presents with     Surgical Followup     1 week S/P septoplasty, nose, with turbinoplasty; bilateral maxillary antrostomy DOS: 7/18/24      PCP: Iván Delgado     Referring Provider: No ref. provider found    There were no vitals taken for this visit.    ENT Problem List:  Patient Active Problem List   Diagnosis     Attention deficit hyperactivity disorder (ADHD)     Generalized anxiety disorder     Mild major depression (H)     GERD (gastroesophageal reflux disease)     Moderate persistent asthma     Morbid obesity (H)     Nasal turbinate hypertrophy     Nasal septal deviation      Current Medications:  Current Outpatient Medications   Medication Sig Dispense Refill     amoxicillin-clavulanate (AUGMENTIN) 875-125 MG tablet Take 1 tablet by mouth 2 times daily for 7 days 14 tablet 0     azelastine (ASTELIN) 0.1 % nasal spray Spray 1 spray into both nostrils 2 times daily 30 mL 1     escitalopram (LEXAPRO) 20 MG tablet Take 1 tablet (20 mg) by mouth daily -due for recheck in clinic 90 tablet 3     fluticasone (FLONASE) 50 MCG/ACT nasal spray Spray 1 spray into both nostrils daily 16 g 1     omeprazole (PRILOSEC) 20 MG DR capsule Take 1 capsule (20 mg) by mouth daily Take in AM 30-60 minutes before eating. 30 capsule 1     oxymetazoline (AFRIN) 0.05 % nasal spray Spray 0.2 mLs (2 sprays) into both nostrils 2 times daily 22 mL 0     TYLENOL OR        No current facility-administered medications for this visit.     HPI  Pleasant 28 year old male presents today as a(n) established patient for follow-up. He is s/p septoplasty, nose, with turbinoplasty; bilateral maxillary antrostomy DOS: 7/18/24. He denies any drainage, difficulty breathing. He has no new  concerns to report since his previous visit. He has plans to attend a birthday part this weekend. He is ready to return to work.    Review of Systems   Constitutional: Negative.    HENT:  Negative for ear discharge.    Eyes: Negative.    Respiratory: Negative.     Gastrointestinal: Negative.    Skin: Negative.    Neurological: Negative.    Endo/Heme/Allergies: Negative.    Psychiatric/Behavioral: Negative.     All other systems reviewed and are negative.      Physical Exam  Vitals and nursing note reviewed.   Constitutional:       Appearance: Normal appearance.   HENT:      Head: Normocephalic and atraumatic.      Right Ear: Ear canal and external ear normal.      Left Ear: Ear canal and external ear normal.      Nose: No septal deviation.   Eyes:      Extraocular Movements: Extraocular movements intact.      Pupils: Pupils are equal, round, and reactive to light.   Neurological:      Mental Status: He is alert.   Psychiatric:         Behavior: Behavior is cooperative.     + no signs of infection  + turbinates are shrinking    A/P  This pleasant patient appears to be healing well s/p septoplasty, nose, with turbinoplasty; bilateral maxillary antrostomy. The turbinates are shrinking He has been cleared from most work-related and activity restrictions, but has been cautioned to use discretion whenever lifting objects more than 20 lbs. He has been encouraged to reach out if there are any concerns before his next visit.     Follow up in clinic in 3-months.    Scribe/Staff:    Scribe Disclosure:   I, Marcella Garcia, am serving as a scribe; to document services personally performed by Bob You MD based on data collection and the provider's statements to me.     Provider Disclosure:  I agree with above History, Review of Systems, Physical exam and Plan.  I have reviewed the content of the documentation and have edited it as needed. I have personally performed the services documented here and the  documentation accurately represents those services and the decisions I have made.      Electronically signed by:  Bob You MD         Again, thank you for allowing me to participate in the care of your patient.        Sincerely,        Bob You MD

## 2024-08-20 ENCOUNTER — TELEPHONE (OUTPATIENT)
Dept: OTOLARYNGOLOGY | Facility: CLINIC | Age: 29
End: 2024-08-20
Payer: COMMERCIAL

## 2024-08-20 NOTE — TELEPHONE ENCOUNTER
[facility] Call Center    Phone Message    May a detailed message be left on voicemail: yes     Reason for Call: Other: Pt called to schedule 3 month follow up post surgery (7/18) 1st available 12/27/24 - Please contact pt if Dr Parker would like to see him sooner.  [place] location, thanks     Action Taken: Other: ENT    Travel Screening: Not Applicable     Date of Service:

## 2024-08-21 ENCOUNTER — MYC MEDICAL ADVICE (OUTPATIENT)
Dept: OTOLARYNGOLOGY | Facility: CLINIC | Age: 29
End: 2024-08-21
Payer: COMMERCIAL

## 2024-10-17 NOTE — PROGRESS NOTES
No chief complaint on file.     PCP: Iván Delgado     Referring Provider: No ref. provider found    There were no vitals taken for this visit.    ENT Problem List:  Patient Active Problem List   Diagnosis    Attention deficit hyperactivity disorder (ADHD)    Generalized anxiety disorder    Mild major depression (H)    GERD (gastroesophageal reflux disease)    Moderate persistent asthma    Morbid obesity (H)    Nasal turbinate hypertrophy    Nasal septal deviation      Current Medications:  Current Outpatient Medications   Medication Sig Dispense Refill    azelastine (ASTELIN) 0.1 % nasal spray Spray 1 spray into both nostrils 2 times daily (Patient not taking: Reported on 7/26/2024) 30 mL 1    escitalopram (LEXAPRO) 20 MG tablet Take 1 tablet (20 mg) by mouth daily -due for recheck in clinic 90 tablet 3    fluticasone (FLONASE) 50 MCG/ACT nasal spray Spray 1 spray into both nostrils daily (Patient not taking: Reported on 7/26/2024) 16 g 1    omeprazole (PRILOSEC) 20 MG DR capsule Take 1 capsule (20 mg) by mouth daily Take in AM 30-60 minutes before eating. (Patient not taking: Reported on 7/26/2024) 30 capsule 1    oxymetazoline (AFRIN) 0.05 % nasal spray Spray 0.2 mLs (2 sprays) into both nostrils 2 times daily 22 mL 0    TYLENOL OR        No current facility-administered medications for this visit.     HPI  Pleasant 28 year old male presents today as an established patient for a postoperative visit following a septoplasty, nose, with turbinoplasty; bilateral maxillary antrostomy on 07/18/2024. He was previously seen on 07/26/2024, where he was healing appropriately and was cleared for most work-related and activity restrictions. He has noticed a return of congestion symptoms after surgery. He feels that he has had some infections since then. He tried some Astelin and another nasal spray [unspecified], without benefit. He says that the right side is more congested then the left. He was supposed to get an  allergy test, but did not follow-up due to having his surgery. He would like a referral to an Allergist again.     Review of Systems   Constitutional: Negative.    HENT:  Positive for congestion.    Eyes: Negative.    Respiratory: Negative.     Gastrointestinal: Negative.    Musculoskeletal: Negative.    Skin: Negative.    Endo/Heme/Allergies: Negative.  Negative for environmental allergies.   All other systems reviewed and are negative.  + postnasal drainage    Physical Exam  Vitals and nursing note reviewed.   Constitutional:       Appearance: Normal appearance.   HENT:      Head: Normocephalic and atraumatic.      Right Ear: Ear canal and external ear normal.      Left Ear: Ear canal and external ear normal.      Nose: Congestion present. No septal deviation.      Mouth/Throat:      Mouth: Mucous membranes are moist.   Eyes:      Extraocular Movements: Extraocular movements intact.      Pupils: Pupils are equal, round, and reactive to light.   Neurological:      Mental Status: He is alert.   Psychiatric:         Behavior: Behavior is cooperative.     + no signs of active infection    A/P  This pleasant patient has chronic non-allergic rhinitis and nasal turbinate hypertrophy that is causing him bilateral congestion that favors the right side. Physical examination shown overall improvement, with some congestion on the right side is more congested than the left. It has been decided to prescribe mometasone (NASONEX) 50 MCG/ACT nasal spray and instructed to spray 2 sprays into both nostrils daily. This is to be taken 10-minutes apart from the previously prescribed Astelin. He is in agreement with this plan, and will return in 2-months as needed.     Follow up in clinic in 2-months.    Scribe/Staff:    Scribe Disclosure:   Marcella PENA, am serving as a scribe; to document services personally performed by Bob You MD based on data collection and the provider's statements to me.     Provider  Disclosure:  I agree with above History, Review of Systems, Physical exam and Plan.  I have reviewed the content of the documentation and have edited it as needed. I have personally performed the services documented here and the documentation accurately represents those services and the decisions I have made.      Electronically signed by:  Bob You MD

## 2024-10-23 ENCOUNTER — OFFICE VISIT (OUTPATIENT)
Dept: OTOLARYNGOLOGY | Facility: CLINIC | Age: 29
End: 2024-10-23
Payer: COMMERCIAL

## 2024-10-23 DIAGNOSIS — J31.0 CHRONIC NONALLERGIC RHINITIS: ICD-10-CM

## 2024-10-23 DIAGNOSIS — J34.3 NASAL TURBINATE HYPERTROPHY: Primary | ICD-10-CM

## 2024-10-23 PROCEDURE — 99213 OFFICE O/P EST LOW 20 MIN: CPT | Performed by: OTOLARYNGOLOGY

## 2024-10-23 RX ORDER — MOMETASONE FUROATE MONOHYDRATE 50 UG/1
2 SPRAY, METERED NASAL DAILY
Qty: 17 G | Refills: 3 | Status: SHIPPED | OUTPATIENT
Start: 2024-10-23

## 2024-10-23 ASSESSMENT — ENCOUNTER SYMPTOMS
MUSCULOSKELETAL NEGATIVE: 1
RESPIRATORY NEGATIVE: 1
GASTROINTESTINAL NEGATIVE: 1
EYES NEGATIVE: 1
CONSTITUTIONAL NEGATIVE: 1

## 2024-10-23 NOTE — NURSING NOTE
Iván Burton's chief complaint for this visit includes:  Chief Complaint   Patient presents with    Follow Up     3 month follow up S/P septoplasty, nose, with turbinoplasty; bilateral maxillary antrostomy DOS: 7/18/24. States that he is back to the way he was prior to surgery. Ongoing since August, feels swollen, can't breathe through the nose, and PND. Tried saline rinses and Astelin, but they did not help so he stopped using them.     PCP: Iván Delgado    Referring Provider:  Referred Self, MD  No address on file    There were no vitals taken for this visit.      Ole Godwin, EMT  October 23, 2024

## 2024-10-28 ENCOUNTER — MYC MEDICAL ADVICE (OUTPATIENT)
Dept: FAMILY MEDICINE | Facility: CLINIC | Age: 29
End: 2024-10-28
Payer: COMMERCIAL

## 2024-10-28 DIAGNOSIS — R00.2 PALPITATIONS: Primary | ICD-10-CM

## 2024-11-02 ENCOUNTER — LAB (OUTPATIENT)
Dept: LAB | Facility: CLINIC | Age: 29
End: 2024-11-02
Payer: COMMERCIAL

## 2024-11-02 DIAGNOSIS — Z11.4 SCREENING FOR HIV (HUMAN IMMUNODEFICIENCY VIRUS): Primary | ICD-10-CM

## 2024-11-02 DIAGNOSIS — R00.2 PALPITATIONS: ICD-10-CM

## 2024-11-02 DIAGNOSIS — Z11.59 NEED FOR HEPATITIS C SCREENING TEST: ICD-10-CM

## 2024-11-02 LAB
HCV AB SERPL QL IA: NONREACTIVE
HIV 1+2 AB+HIV1 P24 AG SERPL QL IA: NONREACTIVE
TSH SERPL DL<=0.005 MIU/L-ACNC: 3.59 UIU/ML (ref 0.3–4.2)

## 2024-11-02 PROCEDURE — 36415 COLL VENOUS BLD VENIPUNCTURE: CPT

## 2024-11-02 PROCEDURE — 87389 HIV-1 AG W/HIV-1&-2 AB AG IA: CPT

## 2024-11-02 PROCEDURE — 84443 ASSAY THYROID STIM HORMONE: CPT

## 2024-11-02 PROCEDURE — 86803 HEPATITIS C AB TEST: CPT

## 2024-11-27 ENCOUNTER — OFFICE VISIT (OUTPATIENT)
Dept: FAMILY MEDICINE | Facility: CLINIC | Age: 29
End: 2024-11-27
Payer: COMMERCIAL

## 2024-11-27 VITALS
BODY MASS INDEX: 38.75 KG/M2 | TEMPERATURE: 97.4 F | WEIGHT: 302 LBS | RESPIRATION RATE: 18 BRPM | DIASTOLIC BLOOD PRESSURE: 76 MMHG | HEART RATE: 56 BPM | SYSTOLIC BLOOD PRESSURE: 132 MMHG | OXYGEN SATURATION: 99 %

## 2024-11-27 DIAGNOSIS — F32.0 MILD MAJOR DEPRESSION (H): ICD-10-CM

## 2024-11-27 DIAGNOSIS — E66.01 MORBID OBESITY (H): ICD-10-CM

## 2024-11-27 DIAGNOSIS — R00.2 PALPITATIONS: Primary | ICD-10-CM

## 2024-11-27 DIAGNOSIS — F41.1 GENERALIZED ANXIETY DISORDER: ICD-10-CM

## 2024-11-27 PROCEDURE — 99214 OFFICE O/P EST MOD 30 MIN: CPT | Performed by: PHYSICIAN ASSISTANT

## 2024-11-27 PROCEDURE — G2211 COMPLEX E/M VISIT ADD ON: HCPCS | Performed by: PHYSICIAN ASSISTANT

## 2024-11-27 RX ORDER — BUPROPION HYDROCHLORIDE 150 MG/1
150 TABLET ORAL EVERY MORNING
Qty: 30 TABLET | Refills: 1 | Status: SHIPPED | OUTPATIENT
Start: 2024-11-27

## 2024-11-27 ASSESSMENT — ASTHMA QUESTIONNAIRES
ACT_TOTALSCORE: 24
QUESTION_2 LAST FOUR WEEKS HOW OFTEN HAVE YOU HAD SHORTNESS OF BREATH: NOT AT ALL
ACT_TOTALSCORE: 24
QUESTION_3 LAST FOUR WEEKS HOW OFTEN DID YOUR ASTHMA SYMPTOMS (WHEEZING, COUGHING, SHORTNESS OF BREATH, CHEST TIGHTNESS OR PAIN) WAKE YOU UP AT NIGHT OR EARLIER THAN USUAL IN THE MORNING: NOT AT ALL
QUESTION_5 LAST FOUR WEEKS HOW WOULD YOU RATE YOUR ASTHMA CONTROL: WELL CONTROLLED
QUESTION_1 LAST FOUR WEEKS HOW MUCH OF THE TIME DID YOUR ASTHMA KEEP YOU FROM GETTING AS MUCH DONE AT WORK, SCHOOL OR AT HOME: NONE OF THE TIME
QUESTION_4 LAST FOUR WEEKS HOW OFTEN HAVE YOU USED YOUR RESCUE INHALER OR NEBULIZER MEDICATION (SUCH AS ALBUTEROL): NOT AT ALL

## 2024-11-27 ASSESSMENT — PAIN SCALES - GENERAL: PAINLEVEL_OUTOF10: NO PAIN (0)

## 2024-11-27 ASSESSMENT — PATIENT HEALTH QUESTIONNAIRE - PHQ9
SUM OF ALL RESPONSES TO PHQ QUESTIONS 1-9: 6
10. IF YOU CHECKED OFF ANY PROBLEMS, HOW DIFFICULT HAVE THESE PROBLEMS MADE IT FOR YOU TO DO YOUR WORK, TAKE CARE OF THINGS AT HOME, OR GET ALONG WITH OTHER PEOPLE: SOMEWHAT DIFFICULT
SUM OF ALL RESPONSES TO PHQ QUESTIONS 1-9: 6

## 2024-11-27 NOTE — PATIENT INSTRUCTIONS
Start Wellbutrin.  This works well with Lexapro.  Continue that for now as well.  Follow-up in 1 month for recheck.

## 2024-11-27 NOTE — PROGRESS NOTES
"  Assessment & Plan   Palpitations  Have improved on their own.  Does think that stress is playing a role.  He has been less stressed still not at goal but his palpation of improved.  He also also noticed more of a pattern that when he does get stress he has more palpitations.  Heart exam was normal today.  Previous Zio patch was also within normal limits.  He will monitor closely and we will treat his stress/anxiety below.    Generalized anxiety disorder  Mild major depression (H)  Lexapro has been helpful but still not enough to control his symptoms of stress, anxiety, mind racing.  He is also having low libido from his Lexapro.  Shared decision making was discussed today and we will start Wellbutrin to try to help with his anxiety as well as side effects of his Lexapro.  Follow-up in 1 month for recheck.  - buPROPion (WELLBUTRIN XL) 150 MG 24 hr tablet; Take 1 tablet (150 mg) by mouth every morning.    Morbid obesity (H)  Body mass index is 38.75 kg/m .. Associated with depression, asthma, gerd which would improve with weight loss. Encouraged healthy lifestyle changes.     The longitudinal plan of care for the diagnosis(es)/condition(s) as documented were addressed during this visit. Due to the added complexity in care, I will continue to support Iván in the subsequent management and with ongoing continuity of care.     BMI  Estimated body mass index is 38.75 kg/m  as calculated from the following:    Height as of 7/3/24: 1.88 m (6' 2.02\").    Weight as of this encounter: 137 kg (302 lb).     Subjective   Iván is a 29 year old, presenting for the following health issues:  Palpitations        11/27/2024    12:56 PM   Additional Questions   Roomed by Janet CARDOZA CMA   Accompanied by Self       History of Present Illness       Reason for visit:  Heart palpitations    He eats 0-1 servings of fruits and vegetables daily.He consumes 0 sweetened beverage(s) daily.He exercises with enough effort to increase his heart " rate 30 to 60 minutes per day.  He exercises with enough effort to increase his heart rate 4 days per week.   He is taking medications regularly.     Review of Systems  See HPI       Objective    /76 (BP Location: Right arm, Patient Position: Sitting, Cuff Size: Adult Large)   Pulse 56   Temp 97.4  F (36.3  C) (Tympanic)   Resp 18   Wt 137 kg (302 lb)   SpO2 99%   BMI 38.75 kg/m    Body mass index is 38.75 kg/m .  Physical Exam   Constitutional: healthy, alert, and no distress  Head: Normocephalic. Atraumatic  Eyes: No conjunctival injection, sclera anicteric  Neck: supple, no thyromegaly, nodules or asymmetry of the thyroid. No cervical LAD.  Cardiovascular: RRR. No murmurs, clicks, gallops, or rubs. No peripheral edema.   Respiratory: No resp distress. Lungs CTAB bilaterally.   Musculoskeletal: extremities normal- no gross deformities noted, and normal muscle tone  Skin: no suspicious lesions or rashes  Neurologic: Gait normal. CN 2-12 grossly intact  Psychiatric: mentation appears normal and affect normal/bright         Signed Electronically by: Iván Delgado PA-C

## 2024-12-30 ENCOUNTER — VIRTUAL VISIT (OUTPATIENT)
Dept: FAMILY MEDICINE | Facility: CLINIC | Age: 29
End: 2024-12-30
Payer: COMMERCIAL

## 2024-12-30 DIAGNOSIS — F32.0 MILD MAJOR DEPRESSION (H): ICD-10-CM

## 2024-12-30 DIAGNOSIS — F41.1 GENERALIZED ANXIETY DISORDER: ICD-10-CM

## 2024-12-30 PROCEDURE — G2211 COMPLEX E/M VISIT ADD ON: HCPCS | Mod: 95 | Performed by: PHYSICIAN ASSISTANT

## 2024-12-30 PROCEDURE — 99213 OFFICE O/P EST LOW 20 MIN: CPT | Mod: 95 | Performed by: PHYSICIAN ASSISTANT

## 2024-12-30 RX ORDER — BUPROPION HYDROCHLORIDE 150 MG/1
150 TABLET ORAL EVERY MORNING
Qty: 90 TABLET | Refills: 3 | Status: SHIPPED | OUTPATIENT
Start: 2024-12-30

## 2024-12-30 ASSESSMENT — ANXIETY QUESTIONNAIRES
3. WORRYING TOO MUCH ABOUT DIFFERENT THINGS: NOT AT ALL
7. FEELING AFRAID AS IF SOMETHING AWFUL MIGHT HAPPEN: SEVERAL DAYS
6. BECOMING EASILY ANNOYED OR IRRITABLE: SEVERAL DAYS
1. FEELING NERVOUS, ANXIOUS, OR ON EDGE: SEVERAL DAYS
8. IF YOU CHECKED OFF ANY PROBLEMS, HOW DIFFICULT HAVE THESE MADE IT FOR YOU TO DO YOUR WORK, TAKE CARE OF THINGS AT HOME, OR GET ALONG WITH OTHER PEOPLE?: NOT DIFFICULT AT ALL
4. TROUBLE RELAXING: NOT AT ALL
GAD7 TOTAL SCORE: 3
2. NOT BEING ABLE TO STOP OR CONTROL WORRYING: NOT AT ALL
IF YOU CHECKED OFF ANY PROBLEMS ON THIS QUESTIONNAIRE, HOW DIFFICULT HAVE THESE PROBLEMS MADE IT FOR YOU TO DO YOUR WORK, TAKE CARE OF THINGS AT HOME, OR GET ALONG WITH OTHER PEOPLE: NOT DIFFICULT AT ALL
5. BEING SO RESTLESS THAT IT IS HARD TO SIT STILL: NOT AT ALL
7. FEELING AFRAID AS IF SOMETHING AWFUL MIGHT HAPPEN: SEVERAL DAYS

## 2024-12-30 ASSESSMENT — PATIENT HEALTH QUESTIONNAIRE - PHQ9
SUM OF ALL RESPONSES TO PHQ QUESTIONS 1-9: 5
10. IF YOU CHECKED OFF ANY PROBLEMS, HOW DIFFICULT HAVE THESE PROBLEMS MADE IT FOR YOU TO DO YOUR WORK, TAKE CARE OF THINGS AT HOME, OR GET ALONG WITH OTHER PEOPLE: NOT DIFFICULT AT ALL
SUM OF ALL RESPONSES TO PHQ QUESTIONS 1-9: 5

## 2024-12-30 NOTE — PATIENT INSTRUCTIONS
Continue Wellbutrin and Lexapro together now that symptoms are controlled. Wellbutrin often dose suppress appetite, so hopefully this will help some. If not, we can definitely help with additional medicine or counseling at your physical in a few months.

## 2025-01-09 ENCOUNTER — OFFICE VISIT (OUTPATIENT)
Dept: FAMILY MEDICINE | Facility: OTHER | Age: 30
End: 2025-01-09
Payer: COMMERCIAL

## 2025-01-09 VITALS
SYSTOLIC BLOOD PRESSURE: 134 MMHG | OXYGEN SATURATION: 96 % | BODY MASS INDEX: 37.73 KG/M2 | HEIGHT: 74 IN | DIASTOLIC BLOOD PRESSURE: 70 MMHG | WEIGHT: 294 LBS | TEMPERATURE: 98.5 F | HEART RATE: 77 BPM | RESPIRATION RATE: 16 BRPM

## 2025-01-09 DIAGNOSIS — J35.1 TONSILLAR HYPERTROPHY: Primary | ICD-10-CM

## 2025-01-09 DIAGNOSIS — H65.191 ACUTE EFFUSION OF RIGHT EAR: ICD-10-CM

## 2025-01-09 PROBLEM — E66.01 MORBID OBESITY (H): Status: RESOLVED | Noted: 2021-11-02 | Resolved: 2025-01-09

## 2025-01-09 RX ORDER — AMOXICILLIN 500 MG/1
500 CAPSULE ORAL 2 TIMES DAILY
Qty: 10 CAPSULE | Refills: 0 | Status: SHIPPED | OUTPATIENT
Start: 2025-01-09

## 2025-01-09 NOTE — PROGRESS NOTES
"  Assessment & Plan     Tonsillar hypertrophy  Acute effusion of right ear  See symptoms below. Patient informs me that his daughter has been sick with similar symptoms. Unsure as to whether she has strep or not. On exam right tonsil is 2-3+ and moderately erythematous. Right TM is erythematous without bulge. I will have patient complete short course of amoxicillin and reach out if not improving. Reviewed possible adverse effects. Reference material attached to AVS.   - amoxicillin (AMOXIL) 500 MG capsule; Take 1 capsule (500 mg) by mouth 2 times daily.      Subjective   Iván is a 29 year old, presenting for the following health issues:  Swollen tonsil (Right side) and Right ear fullness      1/9/2025    10:10 AM   Additional Questions   Roomed by Dannielle JACOB   Accompanied by self     HPI     Acute Illness  Acute illness concerns: Tonsil swollen on right side of throat and right ear fullness  Onset/Duration: 2 weeks  Symptoms:  Fever: No  Chills/Sweats: No  Headache (location?): No  Sinus Pressure: YES  Conjunctivitis:  No  Ear Pain: YES: right ; dull pain more fullness feeling  Rhinorrhea: YES - little bit  Congestion: No  Sore Throat: YES  Cough: a while ago he was not anymore  Wheeze: No  Decreased Appetite: No  Nausea: No  Vomiting: No  Diarrhea: No  Dysuria/Freq.: No  Dysuria or Hematuria: No  Fatigue/Achiness: No  Sick/Strep Exposure: No  Therapies tried and outcome: Advil - helpful sometimes takes pain away for a bit        Review of Systems  Constitutional, HEENT, cardiovascular, pulmonary, gi and gu systems are negative, except as otherwise noted.      Objective    /70   Pulse 77   Temp 98.5  F (36.9  C) (Temporal)   Resp 16   Ht 1.886 m (6' 2.25\")   Wt 133.4 kg (294 lb)   SpO2 96%   BMI 37.49 kg/m    Body mass index is 37.49 kg/m .  Physical Exam   GENERAL: alert and no distress  EYES: Eyes grossly normal to inspection, PERRL and conjunctivae and sclerae normal  HENT: normal cephalic/atraumatic, " right ear: erythematous, left ear: normal: no effusions, no erythema, normal landmarks, nasal mucosa edematous , oral mucous membranes moist, tonsillar hypertrophy, and tonsillar erythema  NECK: no adenopathy, no asymmetry, masses, or scars  RESP: lungs clear to auscultation - no rales, rhonchi or wheezes  CV: regular rate and rhythm, normal S1 S2, no S3 or S4, no murmur, click or rub, no peripheral edema  MS: no gross musculoskeletal defects noted, no edema  PSYCH: mentation appears normal, affect normal/bright            Signed Electronically by: Trent Edmondson PA-C

## 2025-01-13 DIAGNOSIS — H65.191 ACUTE EFFUSION OF RIGHT EAR: ICD-10-CM

## 2025-01-13 DIAGNOSIS — J35.1 TONSILLAR HYPERTROPHY: ICD-10-CM

## 2025-01-13 RX ORDER — AMOXICILLIN 500 MG/1
500 CAPSULE ORAL 2 TIMES DAILY
Qty: 10 CAPSULE | Refills: 0 | Status: SHIPPED | OUTPATIENT
Start: 2025-01-13

## 2025-04-11 ENCOUNTER — MYC MEDICAL ADVICE (OUTPATIENT)
Dept: FAMILY MEDICINE | Facility: CLINIC | Age: 30
End: 2025-04-11

## 2025-04-11 DIAGNOSIS — E66.01 MORBID OBESITY (H): Primary | ICD-10-CM

## 2025-04-14 RX ORDER — TOPIRAMATE 25 MG/1
25 TABLET, FILM COATED ORAL AT BEDTIME
Qty: 30 TABLET | Refills: 2 | Status: SHIPPED | OUTPATIENT
Start: 2025-04-14

## 2025-04-14 RX ORDER — PHENTERMINE HYDROCHLORIDE 15 MG/1
15 CAPSULE ORAL EVERY MORNING
Qty: 30 CAPSULE | Refills: 2 | Status: SHIPPED | OUTPATIENT
Start: 2025-04-14

## 2025-04-15 DIAGNOSIS — E66.01 MORBID OBESITY (H): ICD-10-CM

## 2025-04-15 DIAGNOSIS — F41.1 GENERALIZED ANXIETY DISORDER: ICD-10-CM

## 2025-04-15 DIAGNOSIS — F32.0 MILD MAJOR DEPRESSION: ICD-10-CM

## 2025-04-15 NOTE — TELEPHONE ENCOUNTER
Medication Question or Refill        What medication are you calling about (include dose and sig)?: Pending Prescriptions:                       Disp   Refills    semaglutide-weight management (WEGOVY) 0.*2 mL   0            Sig: Inject 0.5 mLs (0.25 mg) subcutaneously once a           week.     Preferred Pharmacy:   [address] [address]  Phone: [phone] Fax: [phone]    Controlled Substance Agreement on file:   CSA -- Patient Level:    CSA: None found at the patient level.       Who prescribed the medication?: PCP:   Iván Delgado PA-C     Do you need a refill? Yes    Could we send this information to you in [facility] or would you prefer to receive a phone call?:   Patient would prefer a phone call   Okay to leave a detailed message?: Yes at   Home number on file [phone] (home)    Neli Rich/ Patient

## 2025-04-28 ENCOUNTER — MYC MEDICAL ADVICE (OUTPATIENT)
Dept: FAMILY MEDICINE | Facility: CLINIC | Age: 30
End: 2025-04-28
Payer: COMMERCIAL

## (undated) DEVICE — SOL WATER IRRIG 1000ML BOTTLE 07139-09

## (undated) DEVICE — TUBING SUCTION 10'X3/16" N510

## (undated) DEVICE — ANTIFOG SOLUTION W/FOAM PAD 31142527

## (undated) DEVICE — SPONGE COTTONOID 1/2X3" 80-1407

## (undated) DEVICE — SU PLAIN 4-0 SC-1 18" 1824H

## (undated) DEVICE — ANTIFOG SOLUTION W/FOAM PAD CF-1001

## (undated) DEVICE — GLOVE BIOGEL PI MICRO SZ 7.0 48570

## (undated) DEVICE — CONTAINER SPECIMEN 4OZ STERILE 17099

## (undated) DEVICE — ESU COBLATOR REFLEX ULTRA 45DEG W/CABLE EICA4845-01

## (undated) DEVICE — SPLINT INTRANASAL POSISEPX GEL SPONGE 9210584

## (undated) DEVICE — PACK MINOR SBA15MIFSE

## (undated) DEVICE — SU PROLENE 2-0 SH 30" 8833H

## (undated) DEVICE — SYR EAR BULB 3OZ 0035830

## (undated) DEVICE — DRAPE SPLIT EENT 76X124" 3X28" 9447

## (undated) RX ORDER — ACETAMINOPHEN 325 MG/1
TABLET ORAL
Status: DISPENSED
Start: 2024-07-18

## (undated) RX ORDER — OXYMETAZOLINE HYDROCHLORIDE 0.05 G/100ML
SPRAY NASAL
Status: DISPENSED
Start: 2024-07-18